# Patient Record
Sex: MALE | Race: WHITE | Employment: FULL TIME | URBAN - METROPOLITAN AREA
[De-identification: names, ages, dates, MRNs, and addresses within clinical notes are randomized per-mention and may not be internally consistent; named-entity substitution may affect disease eponyms.]

---

## 2017-03-13 ENCOUNTER — TELEPHONE (OUTPATIENT)
Dept: FAMILY MEDICINE CLINIC | Facility: CLINIC | Age: 57
End: 2017-03-13

## 2017-03-13 NOTE — TELEPHONE ENCOUNTER
Pt left message asking if he could have orders for Flu and Tdap. LOV 09/26/16. Called Pt and advised he is due for physical. Labs orders already in. Patient verbalized understanding and agrees with plan. Appt made for 03/23/17.

## 2017-03-13 NOTE — TELEPHONE ENCOUNTER
Patient would like to have flu vaccine done a long with a whooping cough vaccine. Please enter orders.

## 2017-03-23 ENCOUNTER — OFFICE VISIT (OUTPATIENT)
Dept: FAMILY MEDICINE CLINIC | Facility: CLINIC | Age: 57
End: 2017-03-23

## 2017-03-23 VITALS
HEIGHT: 72.5 IN | BODY MASS INDEX: 36.17 KG/M2 | HEART RATE: 76 BPM | RESPIRATION RATE: 16 BRPM | SYSTOLIC BLOOD PRESSURE: 108 MMHG | TEMPERATURE: 97 F | DIASTOLIC BLOOD PRESSURE: 80 MMHG | WEIGHT: 270 LBS

## 2017-03-23 DIAGNOSIS — Z12.11 SCREEN FOR COLON CANCER: ICD-10-CM

## 2017-03-23 DIAGNOSIS — Z82.49 FAMILY HISTORY OF ABDOMINAL AORTIC ANEURYSM (AAA): ICD-10-CM

## 2017-03-23 DIAGNOSIS — K21.9 GASTROESOPHAGEAL REFLUX DISEASE WITHOUT ESOPHAGITIS: ICD-10-CM

## 2017-03-23 DIAGNOSIS — E78.2 MIXED HYPERLIPIDEMIA: ICD-10-CM

## 2017-03-23 DIAGNOSIS — Z00.00 ANNUAL PHYSICAL EXAM: Primary | ICD-10-CM

## 2017-03-23 PROCEDURE — 90686 IIV4 VACC NO PRSV 0.5 ML IM: CPT | Performed by: FAMILY MEDICINE

## 2017-03-23 PROCEDURE — 90472 IMMUNIZATION ADMIN EACH ADD: CPT | Performed by: FAMILY MEDICINE

## 2017-03-23 PROCEDURE — 99396 PREV VISIT EST AGE 40-64: CPT | Performed by: FAMILY MEDICINE

## 2017-03-23 PROCEDURE — 90471 IMMUNIZATION ADMIN: CPT | Performed by: FAMILY MEDICINE

## 2017-03-23 PROCEDURE — 90715 TDAP VACCINE 7 YRS/> IM: CPT | Performed by: FAMILY MEDICINE

## 2017-03-23 RX ORDER — ROSUVASTATIN CALCIUM 10 MG/1
10 TABLET, COATED ORAL NIGHTLY
Qty: 90 TABLET | Refills: 3 | Status: SHIPPED | OUTPATIENT
Start: 2017-03-23 | End: 2018-01-01 | Stop reason: ALTCHOICE

## 2017-03-23 RX ORDER — PANTOPRAZOLE SODIUM 40 MG/1
40 TABLET, DELAYED RELEASE ORAL
COMMUNITY
End: 2017-12-01

## 2017-03-23 NOTE — PROGRESS NOTES
Rossy Schultz is a 61year old male who presents for a complete physical exam.   HPI:   Patient presents with:  Physical: Flu and TDAP       His last annual assessment has been over 1 year: Annual Physical due on 01/19/1962     brother MI at 72, brother # 2 Dysfunction. celecoxib (CELEBREX) 100 MG Oral Cap Take 1 capsule (100 mg total) by mouth 2 (two) times daily as needed. alprazolam (XANAX) 0.5 MG Oral Tab Take 1 tablet by mouth nightly as needed for Sleep.    Loratadine (CLARITIN) 10 MG Oral Cap Take 1 reviewed. Constitutional: He is oriented to person, place, and time. He appears well-developed and well-nourished. No distress. HENT:   Head: Normocephalic and atraumatic.    Right Ear: Hearing, tympanic membrane, external ear and ear canal normal. Tymp weekly.    Health maintenance, UTD Immunizations: UTD    Annual Physical due on [birth year]19/1962  FIT Colorectal Screening due on 01/19/2009  Colonoscopy,10 Years due on 01/19/2009  PSA due on 07/27/2013  Influenza Vaccine(1) due on 09/01/2016    Pt info given fo

## 2017-12-01 ENCOUNTER — OFFICE VISIT (OUTPATIENT)
Dept: FAMILY MEDICINE CLINIC | Facility: CLINIC | Age: 57
End: 2017-12-01

## 2017-12-01 VITALS
BODY MASS INDEX: 35.77 KG/M2 | WEIGHT: 267 LBS | DIASTOLIC BLOOD PRESSURE: 76 MMHG | SYSTOLIC BLOOD PRESSURE: 118 MMHG | HEIGHT: 72.5 IN | HEART RATE: 76 BPM | TEMPERATURE: 99 F | RESPIRATION RATE: 14 BRPM

## 2017-12-01 DIAGNOSIS — Z79.899 MEDICATION MANAGEMENT: ICD-10-CM

## 2017-12-01 DIAGNOSIS — Z12.11 SCREEN FOR COLON CANCER: ICD-10-CM

## 2017-12-01 DIAGNOSIS — F43.0 ACUTE STRESS REACTION: ICD-10-CM

## 2017-12-01 DIAGNOSIS — E78.2 MIXED HYPERLIPIDEMIA: Primary | ICD-10-CM

## 2017-12-01 PROCEDURE — 90471 IMMUNIZATION ADMIN: CPT | Performed by: FAMILY MEDICINE

## 2017-12-01 PROCEDURE — 99213 OFFICE O/P EST LOW 20 MIN: CPT | Performed by: FAMILY MEDICINE

## 2017-12-01 PROCEDURE — 90686 IIV4 VACC NO PRSV 0.5 ML IM: CPT | Performed by: FAMILY MEDICINE

## 2017-12-01 RX ORDER — VARDENAFIL HYDROCHLORIDE 20 MG/1
20 TABLET ORAL
Qty: 12 TABLET | Refills: 5 | Status: SHIPPED | OUTPATIENT
Start: 2017-12-01 | End: 2019-11-29 | Stop reason: ALTCHOICE

## 2017-12-01 RX ORDER — ALPRAZOLAM 0.5 MG/1
0.5 TABLET ORAL NIGHTLY PRN
Qty: 30 TABLET | Refills: 1 | Status: SHIPPED | OUTPATIENT
Start: 2017-12-01 | End: 2018-11-19

## 2017-12-01 RX ORDER — PANTOPRAZOLE SODIUM 40 MG/1
40 TABLET, DELAYED RELEASE ORAL
Qty: 90 TABLET | Refills: 3 | Status: SHIPPED | OUTPATIENT
Start: 2017-12-01 | End: 2018-11-19

## 2017-12-01 RX ORDER — VARDENAFIL HYDROCHLORIDE 20 MG/1
20 TABLET ORAL
COMMUNITY
End: 2017-12-01

## 2017-12-01 NOTE — PROGRESS NOTES
HPI:   Patient presents with:  Hyperlipidemia  Colonoscopy Screening: pt is due      Tamiko Medina is a 61year old male who presents for recheck of his hypertension. He has been taking medications as instructed, with no medication side effects.  His home BP facility-administered medications on file prior to visit. Past History:   He has Mixed hyperlipidemia; Allergic rhinitis; Hemorrhoids; Memory loss; Depression (emotion);  Tremor of right hand; ED (erectile dysfunction); GERD (gastroesophageal reflux dise Diet controlled with 's         Current Assessment & Plan     Stable, Continue present management.              Other Visit Diagnoses     Acute stress reaction        Relevant Medications    ALPRAZolam 0.5 MG Oral Tab    Medication management

## 2018-06-06 ENCOUNTER — OFFICE VISIT (OUTPATIENT)
Dept: FAMILY MEDICINE CLINIC | Facility: CLINIC | Age: 58
End: 2018-06-06

## 2018-06-06 VITALS
RESPIRATION RATE: 16 BRPM | HEIGHT: 74 IN | SYSTOLIC BLOOD PRESSURE: 132 MMHG | HEART RATE: 64 BPM | OXYGEN SATURATION: 98 % | TEMPERATURE: 98 F | WEIGHT: 264 LBS | BODY MASS INDEX: 33.88 KG/M2 | DIASTOLIC BLOOD PRESSURE: 76 MMHG

## 2018-06-06 DIAGNOSIS — R31.9 HEMATURIA, UNSPECIFIED TYPE: ICD-10-CM

## 2018-06-06 DIAGNOSIS — K52.9 GASTROENTERITIS: Primary | ICD-10-CM

## 2018-06-06 PROCEDURE — 99213 OFFICE O/P EST LOW 20 MIN: CPT | Performed by: PHYSICIAN ASSISTANT

## 2018-06-06 PROCEDURE — 81003 URINALYSIS AUTO W/O SCOPE: CPT | Performed by: PHYSICIAN ASSISTANT

## 2018-06-06 NOTE — PROGRESS NOTES
CHIEF COMPLAINT:   Patient presents with:  Sinus Problem: sinus congestion, fever 100.5 yesterday, diarrhea, vomiting-sunday, loss of appetite x4 days (pepto), Lisseth over the weekend    HPI:   Angelita Snow is a 60year old male who presents for complain Body mass index is 33.9 kg/m². Current Outpatient Prescriptions:  ALPRAZolam 0.5 MG Oral Tab Take 1 tablet (0.5 mg total) by mouth nightly as needed for Sleep.  Disp: 30 tablet Rfl: 1   Pantoprazole Sodium 40 MG Oral Tab EC Take 1 tablet (40 mg total) EYES: conjunctiva clear  EARS: TM's clear, no bulging, erythema or fluid bilaterally  NOSE: nostrils patent. Nasal mucosa pink and without exudates. THROAT: oral mucosa pink, slightly dry. Posterior pharynx is not erythematous. No exudates.   NECK: supple PLAN: Proper diet discussed. Increase fluid intake and start solids again. Still may be mildly dehydrated. Instructions as listed below.   Advised to go to the Emergency Room if any worsening of condition, persistent vomiting or diarrhea, any blood in stoo · Go to the Emergency Department if any worsening of condition for moderate to severe abdominal pain, flank pain, persistent vomiting or diarrhea, blood in stool or vomit, cannot keep down fluids, no urination in an 8-hour period of time, new onset of feve · Damage to the urinary tract may cause blood in the urine. This damage may be due to a blow or accident. It may also result from the use of a urinary catheter. Very hard exercise may sometimes irritate the urinary tract and cause bleeding.   · Cancer may o

## 2018-06-06 NOTE — PATIENT INSTRUCTIONS
Please follow up with your PCP if no improvement within 5-7 days. Go directly to the ER for any acute worsening of symptoms. · Rest as much as possible  · Try to drink lots of fluids.   Start with small sips of fluid every 5 minutes, as long as you can ke Many things can lead to blood in the urine (hematuria). The blood may be seen with the eye (macroscopic or gross hematuria). Or it may only be seen when the urine is looked at under a microscope (microscopic hematuria).  Some of the most common causes of bl © 7144-2037 [employer]. [address] [facility], [facility] [place]. All rights reserved. This information is not intended as a substitute for professional medical care. Always follow your healthcare professional's instructions.

## 2018-10-17 ENCOUNTER — TELEPHONE (OUTPATIENT)
Dept: FAMILY MEDICINE CLINIC | Facility: CLINIC | Age: 58
End: 2018-10-17

## 2018-10-17 DIAGNOSIS — Z11.59 ENCOUNTER FOR HEPATITIS C SCREENING TEST FOR LOW RISK PATIENT: ICD-10-CM

## 2018-10-17 DIAGNOSIS — Z00.00 LABORATORY EXAMINATION ORDERED AS PART OF A ROUTINE GENERAL MEDICAL EXAMINATION: ICD-10-CM

## 2018-10-17 DIAGNOSIS — Z12.5 SCREENING FOR PROSTATE CANCER: ICD-10-CM

## 2018-10-17 NOTE — TELEPHONE ENCOUNTER
Patient is scheduled for physical 11/19 please place lab orders to [address] [place] [address]

## 2018-11-09 ENCOUNTER — OFFICE VISIT (OUTPATIENT)
Dept: FAMILY MEDICINE CLINIC | Facility: CLINIC | Age: 58
End: 2018-11-09
Payer: COMMERCIAL

## 2018-11-09 VITALS
TEMPERATURE: 99 F | HEART RATE: 60 BPM | HEIGHT: 73 IN | RESPIRATION RATE: 16 BRPM | WEIGHT: 271 LBS | DIASTOLIC BLOOD PRESSURE: 62 MMHG | SYSTOLIC BLOOD PRESSURE: 116 MMHG | BODY MASS INDEX: 35.92 KG/M2

## 2018-11-09 DIAGNOSIS — K64.5 EXTERNAL THROMBOSED HEMORRHOIDS: Primary | ICD-10-CM

## 2018-11-09 DIAGNOSIS — S60.10XD: ICD-10-CM

## 2018-11-09 PROCEDURE — 90686 IIV4 VACC NO PRSV 0.5 ML IM: CPT | Performed by: FAMILY MEDICINE

## 2018-11-09 PROCEDURE — 90471 IMMUNIZATION ADMIN: CPT | Performed by: FAMILY MEDICINE

## 2018-11-09 PROCEDURE — 99213 OFFICE O/P EST LOW 20 MIN: CPT | Performed by: FAMILY MEDICINE

## 2018-11-09 RX ORDER — HYDROCORTISONE ACETATE 30 MG/1
1 SUPPOSITORY RECTAL 2 TIMES DAILY PRN
Qty: 28 SUPPOSITORY | Refills: 0 | Status: SHIPPED | OUTPATIENT
Start: 2018-11-09 | End: 2018-11-23

## 2018-11-09 RX ORDER — SILDENAFIL 100 MG/1
100 TABLET, FILM COATED ORAL
Qty: 30 TABLET | Refills: 3 | Status: SHIPPED | OUTPATIENT
Start: 2018-11-09 | End: 2019-11-29

## 2018-11-09 NOTE — PROGRESS NOTES
Patient presents with:  Finger Pain: Injured Right Thumb in 09/2018, pt states there is no more pain   Lump: On anus for x 2 weeks, has been using Hydrocortisone cream and does not notice improvement       Subjective   HPI:   This is a 60year old male com Abdominal: Soft. Normal appearance and bowel sounds are normal. There is no tenderness. Genitourinary: Rectal exam shows external hemorrhoid. Musculoskeletal:        Arms:  Neurological: He is alert and oriented to person, place, and time.    Skin

## 2018-11-09 NOTE — PATIENT INSTRUCTIONS
Thrombosed Hemorrhoids    Hemorrhoids are swollen veins in the lower rectum and anus. They're similar to varicose veins that form in the legs. Hemorrhoids can happen inside the rectum (internal hemorrhoids).  Or one may form at the anal opening (external Date Last Reviewed: 6/1/2016  © 3874-0659 [employer] [address]. [address] [facility], [address] [place]. All rights reserved. This information is not intended as a substitute for professional medical care.  Always follow your healthcare professional' Adding fiber to your diet can help relieve constipation by making stools softer and easier to pass. To increase your fiber intake, your healthcare provider may recommend a bulking agent, such as psyllium.  This is a high-fiber supplement available at most g The tips below offer some simple ways to add more high-fiber foods to your meals. · Start your day with a high-fiber breakfast. Eat a wheat bran cereal along with a sliced banana.  Or, try peanut butter on whole-wheat toast.  · Eat carrot sticks for snacks If the damaged nail isn't removed, it will most likely fall off on its own. A fingernail can regrow in a few months. Toenails take longer—as long as a year and a half.  See your healthcare provider if you have any problems with the nail as it heals and regr

## 2018-11-16 RX ORDER — MINOCYCLINE HYDROCHLORIDE 100 MG/1
100 CAPSULE ORAL
Refills: 0 | COMMUNITY
Start: 2018-07-20 | End: 2018-11-19 | Stop reason: ALTCHOICE

## 2018-11-19 ENCOUNTER — OFFICE VISIT (OUTPATIENT)
Dept: FAMILY MEDICINE CLINIC | Facility: CLINIC | Age: 58
End: 2018-11-19
Payer: COMMERCIAL

## 2018-11-19 VITALS
HEIGHT: 72.5 IN | RESPIRATION RATE: 12 BRPM | BODY MASS INDEX: 35.77 KG/M2 | DIASTOLIC BLOOD PRESSURE: 70 MMHG | TEMPERATURE: 98 F | WEIGHT: 267 LBS | HEART RATE: 64 BPM | SYSTOLIC BLOOD PRESSURE: 102 MMHG

## 2018-11-19 DIAGNOSIS — Z79.899 MEDICATION MANAGEMENT: ICD-10-CM

## 2018-11-19 DIAGNOSIS — Z00.00 ANNUAL PHYSICAL EXAM: Primary | ICD-10-CM

## 2018-11-19 DIAGNOSIS — Z12.11 SCREEN FOR COLON CANCER: ICD-10-CM

## 2018-11-19 DIAGNOSIS — F43.0 ACUTE STRESS REACTION: ICD-10-CM

## 2018-11-19 DIAGNOSIS — R31.21 ASYMPTOMATIC MICROSCOPIC HEMATURIA: ICD-10-CM

## 2018-11-19 DIAGNOSIS — M25.50 ARTHRALGIA OF MULTIPLE SITES: ICD-10-CM

## 2018-11-19 DIAGNOSIS — E78.2 MIXED HYPERLIPIDEMIA: ICD-10-CM

## 2018-11-19 PROCEDURE — 99396 PREV VISIT EST AGE 40-64: CPT | Performed by: FAMILY MEDICINE

## 2018-11-19 RX ORDER — ALPRAZOLAM 0.5 MG/1
0.5 TABLET ORAL NIGHTLY PRN
Qty: 30 TABLET | Refills: 1 | Status: SHIPPED | OUTPATIENT
Start: 2018-11-19 | End: 2019-11-29

## 2018-11-19 RX ORDER — PANTOPRAZOLE SODIUM 40 MG/1
40 TABLET, DELAYED RELEASE ORAL
Qty: 90 TABLET | Refills: 3 | Status: SHIPPED | OUTPATIENT
Start: 2018-11-19 | End: 2019-08-16

## 2018-11-20 NOTE — PROGRESS NOTES
Jennyfer Saavedra is a 60year old male who presents for a complete physical exam.   HPI:   Patient presents with:  Physical  Testing: colonoscopy      His last annual assessment has been over 1 year: Annual Physical due on 03/23/2018       Pt complains of doing 09:39 AM     (H) 11/09/2018 09:39 AM    NONHDLC 159 (H) 11/09/2018 09:39 AM       No results found for: A1C, VITD, PSA           Current Outpatient Medications on File Prior to Visit:  Econazole Nitrate 1 % External Cream APPLY TOPICALLY TWICE A DAY completed. Pertinent positives and negatives noted in the the HPI.  Specifically:  GEN:  No fever or fatigue  HEAD:  No headaches  EYES:  No vision change  EARS:  No hearing loss  MOUTH/THROAT:  No sore throat or dental problems  HEART:  No chest pain or p Soft. Bowel sounds are normal. He exhibits no distension. There is no hepatosplenomegaly. There is no tenderness. There is no rebound and no guarding. No hernia. Musculoskeletal: Normal range of motion.    Lymphadenopathy:     He has no cervical adenopath Celecoxib 100         Current Assessment & Plan     Stable Continue present management.             Other Visit Diagnoses     Annual physical exam    -  Primary    Acute stress reaction        Relevant Medications    ALPRAZolam 0.5 MG Oral Tab    Medicatio

## 2019-05-23 ENCOUNTER — HOSPITAL ENCOUNTER (OUTPATIENT)
Dept: GENERAL RADIOLOGY | Facility: HOSPITAL | Age: 59
Discharge: HOME | End: 2019-05-23
Attending: PHYSICIAN ASSISTANT
Payer: COMMERCIAL

## 2019-05-23 ENCOUNTER — OFFICE VISIT (OUTPATIENT)
Dept: FAMILY MEDICINE CLINIC | Facility: CLINIC | Age: 59
End: 2019-05-23
Payer: COMMERCIAL

## 2019-05-23 VITALS
SYSTOLIC BLOOD PRESSURE: 92 MMHG | OXYGEN SATURATION: 98 % | TEMPERATURE: 100 F | HEART RATE: 72 BPM | WEIGHT: 268.81 LBS | DIASTOLIC BLOOD PRESSURE: 70 MMHG | BODY MASS INDEX: 36.41 KG/M2 | RESPIRATION RATE: 16 BRPM | HEIGHT: 72 IN

## 2019-05-23 DIAGNOSIS — R05.9 COUGH: Primary | ICD-10-CM

## 2019-05-23 DIAGNOSIS — R05.9 COUGH: ICD-10-CM

## 2019-05-23 PROCEDURE — 99213 OFFICE O/P EST LOW 20 MIN: CPT | Performed by: PHYSICIAN ASSISTANT

## 2019-05-23 PROCEDURE — 71046 X-RAY EXAM CHEST 2 VIEWS: CPT | Performed by: PHYSICIAN ASSISTANT

## 2019-05-23 PROCEDURE — 94640 AIRWAY INHALATION TREATMENT: CPT | Performed by: PHYSICIAN ASSISTANT

## 2019-05-23 RX ORDER — PREDNISONE 20 MG/1
40 TABLET ORAL DAILY
Qty: 10 TABLET | Refills: 0 | Status: SHIPPED | OUTPATIENT
Start: 2019-05-23 | End: 2019-05-28

## 2019-05-23 RX ORDER — ALBUTEROL SULFATE 2.5 MG/3ML
2.5 SOLUTION RESPIRATORY (INHALATION) ONCE
Status: COMPLETED | OUTPATIENT
Start: 2019-05-23 | End: 2019-05-23

## 2019-05-23 RX ORDER — CODEINE PHOSPHATE AND GUAIFENESIN 10; 100 MG/5ML; MG/5ML
5 SOLUTION ORAL EVERY 6 HOURS PRN
Qty: 118 ML | Refills: 0 | Status: SHIPPED | OUTPATIENT
Start: 2019-05-23 | End: 2019-11-08

## 2019-05-23 RX ORDER — ALBUTEROL SULFATE 90 UG/1
2 AEROSOL, METERED RESPIRATORY (INHALATION) EVERY 4 HOURS PRN
Qty: 1 INHALER | Refills: 1 | Status: SHIPPED | OUTPATIENT
Start: 2019-05-23 | End: 2019-11-08

## 2019-05-23 RX ADMIN — ALBUTEROL SULFATE 2.5 MG: 2.5 SOLUTION RESPIRATORY (INHALATION) at 15:44:00

## 2019-05-25 NOTE — PROGRESS NOTES
CC: Persistent dry cough     HISTORY OF PRESENT ILLNESS  Vivian Bender is a 60year old male who presents for evaluation of cough. For the last 5 nights, he has had a persistently dry cough.  He does feel that his chest has been tight with wheezing and shortn Hemorrhoids     Memory loss     Depression (emotion)     Tremor of right hand     ED (erectile dysfunction)     GERD (gastroesophageal reflux disease)     Arthralgia of multiple sites     Rosacea     Arthritis of multiple sites      Past Surgical History:

## 2019-07-15 PROCEDURE — 88305 TISSUE EXAM BY PATHOLOGIST: CPT | Performed by: INTERNAL MEDICINE

## 2019-11-08 ENCOUNTER — OFFICE VISIT (OUTPATIENT)
Dept: FAMILY MEDICINE CLINIC | Facility: CLINIC | Age: 59
End: 2019-11-08
Payer: COMMERCIAL

## 2019-11-08 VITALS
RESPIRATION RATE: 16 BRPM | SYSTOLIC BLOOD PRESSURE: 124 MMHG | TEMPERATURE: 98 F | WEIGHT: 267 LBS | HEIGHT: 72 IN | DIASTOLIC BLOOD PRESSURE: 70 MMHG | BODY MASS INDEX: 36.16 KG/M2 | HEART RATE: 79 BPM

## 2019-11-08 DIAGNOSIS — R05.9 COUGH: Primary | ICD-10-CM

## 2019-11-08 PROCEDURE — 99213 OFFICE O/P EST LOW 20 MIN: CPT | Performed by: PHYSICIAN ASSISTANT

## 2019-11-08 RX ORDER — CODEINE PHOSPHATE AND GUAIFENESIN 10; 100 MG/5ML; MG/5ML
5 SOLUTION ORAL EVERY 6 HOURS PRN
Qty: 118 ML | Refills: 0 | Status: SHIPPED | OUTPATIENT
Start: 2019-11-08 | End: 2019-11-29 | Stop reason: ALTCHOICE

## 2019-11-08 NOTE — PROGRESS NOTES
Patient presents with:  Cough: monday,  yellow phlegm   Chest Congestion: pressure, cheratussin       HISTORY OF PRESENT ILLNESS  Renetta Quinteros is a 60year old male who presents for evaluation of cough.  For the past 4-5 days, he has been having a productive current smoker    Alcohol use: No      Alcohol/week: 0.0 standard drinks    Drug use: No       11/08/19  1047   BP: 124/70   Pulse: 79   Resp: 16   Temp: 97.9 °F (36.6 °C)       PHYSICAL EXAM  GENERAL: Alert male in no acute distress.   HEAD: Normocephalic,

## 2019-11-15 ENCOUNTER — OFFICE VISIT (OUTPATIENT)
Dept: FAMILY MEDICINE CLINIC | Facility: CLINIC | Age: 59
End: 2019-11-15
Payer: COMMERCIAL

## 2019-11-15 VITALS
BODY MASS INDEX: 35.76 KG/M2 | WEIGHT: 264 LBS | DIASTOLIC BLOOD PRESSURE: 60 MMHG | SYSTOLIC BLOOD PRESSURE: 130 MMHG | RESPIRATION RATE: 18 BRPM | HEART RATE: 74 BPM | TEMPERATURE: 98 F | HEIGHT: 72 IN

## 2019-11-15 DIAGNOSIS — R05.9 COUGH: Primary | ICD-10-CM

## 2019-11-15 PROCEDURE — 99213 OFFICE O/P EST LOW 20 MIN: CPT | Performed by: PHYSICIAN ASSISTANT

## 2019-11-15 RX ORDER — AZITHROMYCIN 250 MG/1
TABLET, FILM COATED ORAL
Qty: 6 TABLET | Refills: 0 | Status: SHIPPED | OUTPATIENT
Start: 2019-11-15 | End: 2019-11-29 | Stop reason: ALTCHOICE

## 2019-11-15 RX ORDER — PREDNISONE 20 MG/1
TABLET ORAL
Qty: 8 TABLET | Refills: 0 | Status: SHIPPED | OUTPATIENT
Start: 2019-11-15 | End: 2019-11-20

## 2019-11-18 NOTE — PROGRESS NOTES
Patient presents with:  Cough: X 1 WEEK. FEELING ABOUT THE SAME . PHLEGM YELLOW. NOT OTC       HISTORY OF PRESENT ILLNESS  Garret Saenz is a 60year old male who presents for evaluation of persistent cough. I last saw him for this on 11/8.  At that time, dis Arthritis of multiple sites      Past Surgical History:   Procedure Laterality Date   • Anesth,knee arthroscopy  8/1/06   • Anesth,knee arthroscopy  1/1/82    Right   • Appendectomy      As a child       Social History    Tobacco Use      Smoking status: N

## 2019-11-29 ENCOUNTER — OFFICE VISIT (OUTPATIENT)
Dept: FAMILY MEDICINE CLINIC | Facility: CLINIC | Age: 59
End: 2019-11-29
Payer: COMMERCIAL

## 2019-11-29 VITALS
TEMPERATURE: 97 F | HEIGHT: 72 IN | WEIGHT: 264 LBS | RESPIRATION RATE: 16 BRPM | SYSTOLIC BLOOD PRESSURE: 104 MMHG | DIASTOLIC BLOOD PRESSURE: 70 MMHG | BODY MASS INDEX: 35.76 KG/M2 | HEART RATE: 60 BPM

## 2019-11-29 DIAGNOSIS — F32.A DEPRESSION, UNSPECIFIED DEPRESSION TYPE: ICD-10-CM

## 2019-11-29 DIAGNOSIS — N52.9 ERECTILE DYSFUNCTION, UNSPECIFIED ERECTILE DYSFUNCTION TYPE: ICD-10-CM

## 2019-11-29 DIAGNOSIS — Z00.00 LABORATORY EXAMINATION ORDERED AS PART OF A ROUTINE GENERAL MEDICAL EXAMINATION: ICD-10-CM

## 2019-11-29 DIAGNOSIS — F43.0 ACUTE STRESS REACTION: ICD-10-CM

## 2019-11-29 DIAGNOSIS — Z79.899 MEDICATION MANAGEMENT: ICD-10-CM

## 2019-11-29 DIAGNOSIS — E78.2 MIXED HYPERLIPIDEMIA: Primary | ICD-10-CM

## 2019-11-29 PROCEDURE — 90686 IIV4 VACC NO PRSV 0.5 ML IM: CPT | Performed by: FAMILY MEDICINE

## 2019-11-29 PROCEDURE — 99214 OFFICE O/P EST MOD 30 MIN: CPT | Performed by: FAMILY MEDICINE

## 2019-11-29 PROCEDURE — 90471 IMMUNIZATION ADMIN: CPT | Performed by: FAMILY MEDICINE

## 2019-11-29 RX ORDER — SILDENAFIL 100 MG/1
100 TABLET, FILM COATED ORAL
Qty: 30 TABLET | Refills: 3 | Status: SHIPPED | OUTPATIENT
Start: 2019-11-29 | End: 2021-07-01

## 2019-11-29 RX ORDER — ALPRAZOLAM 0.5 MG/1
0.5 TABLET ORAL NIGHTLY PRN
Qty: 30 TABLET | Refills: 1 | Status: SHIPPED | OUTPATIENT
Start: 2019-11-29

## 2019-11-29 NOTE — PROGRESS NOTES
HPI:   Patient presents with:  Erectile Dysfuntion    Subjective   Dora Braga is a 60year old male who presents for recheck of his hypertension. He has been taking medications as instructed, with no medication side effects.  His home BP monitoring in the HENT:   Head: Normocephalic and atraumatic.    Right Ear: Tympanic membrane, external ear and ear canal normal.   Left Ear: Tympanic membrane, external ear and ear canal normal.   Nose: Nose normal.   Mouth/Throat: Uvula is midline, oropharynx is clear an diet, exercise and weight control, and labs as ordered      Problem List Items Addressed This Visit        Cardiovascular    Mixed hyperlipidemia - Primary    Overview     Diet controlled with 's         Current Assessment & Plan     Stable labs due

## 2020-01-27 RX ORDER — PANTOPRAZOLE SODIUM 40 MG/1
TABLET, DELAYED RELEASE ORAL
Qty: 90 TABLET | Refills: 3 | OUTPATIENT
Start: 2020-01-27

## 2020-07-15 ENCOUNTER — OFFICE VISIT (OUTPATIENT)
Dept: FAMILY MEDICINE CLINIC | Facility: CLINIC | Age: 60
End: 2020-07-15
Payer: COMMERCIAL

## 2020-07-15 VITALS
HEIGHT: 73 IN | TEMPERATURE: 97 F | DIASTOLIC BLOOD PRESSURE: 70 MMHG | WEIGHT: 276 LBS | BODY MASS INDEX: 36.58 KG/M2 | SYSTOLIC BLOOD PRESSURE: 124 MMHG | HEART RATE: 70 BPM | RESPIRATION RATE: 16 BRPM

## 2020-07-15 DIAGNOSIS — Z00.00 LABORATORY EXAMINATION ORDERED AS PART OF A ROUTINE GENERAL MEDICAL EXAMINATION: ICD-10-CM

## 2020-07-15 DIAGNOSIS — B35.1 ONYCHOMYCOSIS OF TOENAIL: Primary | ICD-10-CM

## 2020-07-15 PROCEDURE — 99213 OFFICE O/P EST LOW 20 MIN: CPT | Performed by: FAMILY MEDICINE

## 2020-07-15 PROCEDURE — 3008F BODY MASS INDEX DOCD: CPT | Performed by: FAMILY MEDICINE

## 2020-07-15 PROCEDURE — 3078F DIAST BP <80 MM HG: CPT | Performed by: FAMILY MEDICINE

## 2020-07-15 PROCEDURE — 3074F SYST BP LT 130 MM HG: CPT | Performed by: FAMILY MEDICINE

## 2020-07-15 RX ORDER — PANTOPRAZOLE SODIUM 40 MG/1
40 TABLET, DELAYED RELEASE ORAL
Qty: 180 TABLET | Refills: 0 | Status: SHIPPED | OUTPATIENT
Start: 2020-07-15 | End: 2021-08-16

## 2020-07-15 NOTE — PROGRESS NOTES
Patient presents with:  Fungus Nails: Fungal on Toenails on both Feet      Subjective   HPI:   This is a 63year old male coming in for toenail infection, 5 total nails,     HPI   See reviewed tab for PMSFHx  REVIEW OF SYSTEMS:   GENERAL HEALTH: feels well 7/15/2020, 9:58 AM

## 2020-07-24 LAB
ALBUMIN/GLOBULIN RATIO: 1.5 (CALC) (ref 1–2.5)
ALBUMIN: 4.4 G/DL (ref 3.6–5.1)
ALKALINE PHOSPHATASE: 60 U/L (ref 35–144)
ALT: 15 U/L (ref 9–46)
AST: 15 U/L (ref 10–35)
BILIRUBIN, TOTAL: 0.6 MG/DL (ref 0.2–1.2)
BUN: 14 MG/DL (ref 7–25)
CALCIUM: 9.8 MG/DL (ref 8.6–10.3)
CARBON DIOXIDE: 28 MMOL/L (ref 20–32)
CHLORIDE: 104 MMOL/L (ref 98–110)
CHOL/HDLC RATIO: 5.1 (CALC)
CHOLESTEROL, TOTAL: 219 MG/DL
CREATININE: 1.13 MG/DL (ref 0.7–1.25)
EGFR IF AFRICN AM: 81 ML/MIN/1.73M2
EGFR IF NONAFRICN AM: 70 ML/MIN/1.73M2
GLOBULIN: 2.9 G/DL (CALC) (ref 1.9–3.7)
GLUCOSE: 84 MG/DL (ref 65–99)
HDL CHOLESTEROL: 43 MG/DL
HEMATOCRIT: 51.5 % (ref 38.5–50)
HEMOGLOBIN: 17.6 G/DL (ref 13.2–17.1)
LDL-CHOLESTEROL: 154 MG/DL (CALC)
MCH: 30.4 PG (ref 27–33)
MCHC: 34.2 G/DL (ref 32–36)
MCV: 89.1 FL (ref 80–100)
MPV: 9.5 FL (ref 7.5–12.5)
NON-HDL CHOLESTEROL: 176 MG/DL (CALC)
PLATELET COUNT: 262 THOUSAND/UL (ref 140–400)
POTASSIUM: 4.8 MMOL/L (ref 3.5–5.3)
PROTEIN, TOTAL: 7.3 G/DL (ref 6.1–8.1)
RDW: 13.2 % (ref 11–15)
RED BLOOD CELL COUNT: 5.78 MILLION/UL (ref 4.2–5.8)
SODIUM: 140 MMOL/L (ref 135–146)
TRIGLYCERIDES: 105 MG/DL
TSH W/REFLEX TO FT4: 1.35 MIU/L (ref 0.4–4.5)
WHITE BLOOD CELL COUNT: 6.9 THOUSAND/UL (ref 3.8–10.8)

## 2020-07-31 ENCOUNTER — TELEPHONE (OUTPATIENT)
Dept: FAMILY MEDICINE CLINIC | Facility: CLINIC | Age: 60
End: 2020-07-31

## 2020-07-31 DIAGNOSIS — B35.1 ONYCHOMYCOSIS OF TOENAIL: Primary | ICD-10-CM

## 2020-07-31 RX ORDER — HYDROCORTISONE 25 MG/G
CREAM TOPICAL
Qty: 28.35 G | Refills: 3 | Status: SHIPPED | OUTPATIENT
Start: 2020-07-31

## 2020-07-31 NOTE — TELEPHONE ENCOUNTER
Patient called requesting a cream for toe nail fungus to be called in to the pharmacy?  States spoke about it with Dr Deondre Barrera on his appt on 07/15

## 2020-07-31 NOTE — TELEPHONE ENCOUNTER
Requested Prescriptions     Pending Prescriptions Disp Refills   • PROCTOSOL HC 2.5 % External Cream [Pharmacy Med Name: PROCTOSOL-HC 2.5% CREAM] 28.35 g 3     Sig: PLACE 1 APPLICATION RECTALLY 2 (TWO) TIMES DAILY FOR 10 DAYS.      LOV 7/15/2020     Patient

## 2020-08-02 RX ORDER — TERBINAFINE HYDROCHLORIDE 250 MG/1
250 TABLET ORAL DAILY
Qty: 84 TABLET | Refills: 0 | Status: SHIPPED | OUTPATIENT
Start: 2020-08-02 | End: 2020-08-07

## 2020-08-02 NOTE — TELEPHONE ENCOUNTER
Diagnoses and all orders for this visit:    Onychomycosis of toenail  -     Terbinafine HCl 250 MG Oral Tab; Take 1 tablet (250 mg total) by mouth daily. OK to notify.  Thanks, Amparo Baldwin MD

## 2020-08-04 PROBLEM — B35.1 ONYCHOMYCOSIS OF TOENAIL: Status: ACTIVE | Noted: 2020-08-04

## 2020-08-05 ENCOUNTER — TELEPHONE (OUTPATIENT)
Dept: FAMILY MEDICINE CLINIC | Facility: CLINIC | Age: 60
End: 2020-08-05

## 2020-08-05 DIAGNOSIS — B35.1 ONYCHOMYCOSIS OF TOENAIL: ICD-10-CM

## 2020-08-05 NOTE — TELEPHONE ENCOUNTER
Received fax for pt for a PA for Terbinafine  mg. Paperwork in triage.      Patient verbalized understanding the PA process

## 2020-08-06 NOTE — TELEPHONE ENCOUNTER
Completed PA for Terbinafine 250mg using [employer] form on [facility] documenting pt diagnosis B35.1. Received denial for coverage of Terbinafine.  Insurance requires a test to confirm fungus and/or pt immunocompromised with an extensive or complicated fungal infe

## 2020-08-07 RX ORDER — TERBINAFINE HYDROCHLORIDE 250 MG/1
250 TABLET ORAL DAILY
Qty: 84 TABLET | Refills: 0 | Status: SHIPPED | OUTPATIENT
Start: 2020-08-07 | End: 2020-10-30

## 2021-04-09 DIAGNOSIS — Z23 NEED FOR VACCINATION: ICD-10-CM

## 2021-04-27 NOTE — TELEPHONE ENCOUNTER
Refill request for:    Requested Prescriptions     Pending Prescriptions Disp Refills   • PANTOPRAZOLE SODIUM 40 MG Oral Tab EC [Pharmacy Med Name: PANTOPRAZOLE SOD DR 40 MG TAB] 180 tablet 0     Sig: TAKE 1 TABLET (40 MG TOTAL) BY MOUTH 2 (TWO) TIMES KENYON

## 2021-05-03 NOTE — TELEPHONE ENCOUNTER
LM for patient to schedule his physical and to make sure he has enough medication to get to that appt.

## 2021-05-04 ENCOUNTER — TELEPHONE (OUTPATIENT)
Dept: FAMILY MEDICINE CLINIC | Facility: CLINIC | Age: 61
End: 2021-05-04

## 2021-05-04 DIAGNOSIS — Z00.00 LABORATORY EXAMINATION ORDERED AS PART OF A ROUTINE GENERAL MEDICAL EXAMINATION: ICD-10-CM

## 2021-05-04 DIAGNOSIS — E78.2 MIXED HYPERLIPIDEMIA: Primary | ICD-10-CM

## 2021-05-04 DIAGNOSIS — Z12.5 SCREENING FOR PROSTATE CANCER: ICD-10-CM

## 2021-05-04 NOTE — TELEPHONE ENCOUNTER
Please enter lab orders for the patient's upcoming physical appointment. Physical scheduled:    Your appointments     Date & Time Appointment Department [facility])    May 19, 2021  3:30 PM CDT Physical - Established with Armen Sarmiento  [facility]

## 2021-05-04 NOTE — TELEPHONE ENCOUNTER
Patient called back and scheduled his physical for 05/19/21, patient has enough medication to last him until his appointment

## 2021-05-04 NOTE — TELEPHONE ENCOUNTER
1. Mixed hyperlipidemia (Primary)  Overview:  Diet controlled with 's  2. Laboratory examination ordered as part of a routine general medical examination  -     Comp Metabolic Panel (14)  -     Lipid Panel  -     CBC, Platelet;  No Differential  -

## 2021-05-05 RX ORDER — PANTOPRAZOLE SODIUM 40 MG/1
40 TABLET, DELAYED RELEASE ORAL
Qty: 180 TABLET | Refills: 0 | OUTPATIENT
Start: 2021-05-05

## 2021-05-10 ENCOUNTER — OFFICE VISIT (OUTPATIENT)
Dept: FAMILY MEDICINE CLINIC | Facility: CLINIC | Age: 61
End: 2021-05-10
Payer: COMMERCIAL

## 2021-05-10 VITALS
OXYGEN SATURATION: 98 % | BODY MASS INDEX: 36.98 KG/M2 | RESPIRATION RATE: 14 BRPM | WEIGHT: 279 LBS | HEART RATE: 78 BPM | HEIGHT: 73 IN | SYSTOLIC BLOOD PRESSURE: 118 MMHG | DIASTOLIC BLOOD PRESSURE: 70 MMHG | TEMPERATURE: 99 F

## 2021-05-10 DIAGNOSIS — H60.542 ACUTE ECZEMATOID OTITIS EXTERNA OF LEFT EAR: Primary | ICD-10-CM

## 2021-05-10 PROCEDURE — 3078F DIAST BP <80 MM HG: CPT | Performed by: FAMILY MEDICINE

## 2021-05-10 PROCEDURE — 99213 OFFICE O/P EST LOW 20 MIN: CPT | Performed by: FAMILY MEDICINE

## 2021-05-10 PROCEDURE — 3074F SYST BP LT 130 MM HG: CPT | Performed by: FAMILY MEDICINE

## 2021-05-10 PROCEDURE — 3008F BODY MASS INDEX DOCD: CPT | Performed by: FAMILY MEDICINE

## 2021-05-10 RX ORDER — NEOMYCIN SULFATE, POLYMYXIN B SULFATE AND HYDROCORTISONE 10; 3.5; 1 MG/ML; MG/ML; [USP'U]/ML
3 SUSPENSION/ DROPS AURICULAR (OTIC) 3 TIMES DAILY
Qty: 10 ML | Refills: 0 | Status: SHIPPED | OUTPATIENT
Start: 2021-05-10 | End: 2021-05-18 | Stop reason: ALTCHOICE

## 2021-05-10 NOTE — PROGRESS NOTES
Chief Complaint:  Patient presents with:  Ear Problem: x 3 weeks, Left Ear Itchy     HPI:  This is a 57year old male patient presenting for Ear Problem (x 3 weeks, Left Ear Itchy )    Notes L ear pruritus for about 3 weeks.  Denies drainage, pain or flakin 1   • PROCTOSOL HC 2.5 % External Cream PLACE 1 APPLICATION RECTALLY 2 (TWO) TIMES DAILY FOR 10 DAYS. 28.35 g 3   • Pantoprazole Sodium 40 MG Oral Tab EC Take 1 tablet (40 mg total) by mouth 2 (two) times daily before meals.  180 tablet 0   • Sildenafil Cit 5/10/2021 2:30 PM  Family Medicine

## 2021-05-18 ENCOUNTER — OFFICE VISIT (OUTPATIENT)
Dept: FAMILY MEDICINE CLINIC | Facility: CLINIC | Age: 61
End: 2021-05-18
Payer: COMMERCIAL

## 2021-05-18 VITALS
BODY MASS INDEX: 36.58 KG/M2 | RESPIRATION RATE: 16 BRPM | HEART RATE: 70 BPM | SYSTOLIC BLOOD PRESSURE: 110 MMHG | WEIGHT: 276 LBS | DIASTOLIC BLOOD PRESSURE: 80 MMHG | HEIGHT: 73 IN | TEMPERATURE: 97 F

## 2021-05-18 DIAGNOSIS — H93.8X2 PRESSURE SENSATION IN LEFT EAR: Primary | ICD-10-CM

## 2021-05-18 PROCEDURE — 3079F DIAST BP 80-89 MM HG: CPT | Performed by: NURSE PRACTITIONER

## 2021-05-18 PROCEDURE — 3074F SYST BP LT 130 MM HG: CPT | Performed by: NURSE PRACTITIONER

## 2021-05-18 PROCEDURE — 99213 OFFICE O/P EST LOW 20 MIN: CPT | Performed by: NURSE PRACTITIONER

## 2021-05-18 PROCEDURE — 3008F BODY MASS INDEX DOCD: CPT | Performed by: NURSE PRACTITIONER

## 2021-05-18 RX ORDER — PREDNISONE 20 MG/1
40 TABLET ORAL DAILY
Qty: 10 TABLET | Refills: 0 | Status: SHIPPED | OUTPATIENT
Start: 2021-05-18

## 2021-05-18 NOTE — PROGRESS NOTES
Patient presents with:  Ear Problem: follow up for infection, pt stated getting worse       HPI:  Presents with approx 4 week history of left ear itching w/o fever/chills, nasal/sinus congestion, sore throat, ear pain/pressure, cough, loss of smell/taste, Tab Take 1 tablet (100 mg total) by mouth daily as needed for Erectile Dysfunction. 30 tablet 3   • ALPRAZolam 0.5 MG Oral Tab Take 1 tablet (0.5 mg total) by mouth nightly as needed for Sleep.  30 tablet 1   • Econazole Nitrate 1 % External Cream APPLY TOP

## 2021-05-19 ENCOUNTER — OFFICE VISIT (OUTPATIENT)
Dept: FAMILY MEDICINE CLINIC | Facility: CLINIC | Age: 61
End: 2021-05-19
Payer: COMMERCIAL

## 2021-05-19 VITALS
DIASTOLIC BLOOD PRESSURE: 80 MMHG | HEIGHT: 73 IN | SYSTOLIC BLOOD PRESSURE: 114 MMHG | RESPIRATION RATE: 18 BRPM | WEIGHT: 275 LBS | BODY MASS INDEX: 36.45 KG/M2 | HEART RATE: 76 BPM

## 2021-05-19 DIAGNOSIS — L71.9 ROSACEA: ICD-10-CM

## 2021-05-19 DIAGNOSIS — Z00.00 ANNUAL PHYSICAL EXAM: Primary | ICD-10-CM

## 2021-05-19 DIAGNOSIS — K21.9 GASTROESOPHAGEAL REFLUX DISEASE, UNSPECIFIED WHETHER ESOPHAGITIS PRESENT: ICD-10-CM

## 2021-05-19 DIAGNOSIS — E78.2 MIXED HYPERLIPIDEMIA: ICD-10-CM

## 2021-05-19 DIAGNOSIS — F33.0 MILD EPISODE OF RECURRENT MAJOR DEPRESSIVE DISORDER (HCC): ICD-10-CM

## 2021-05-19 PROCEDURE — 3008F BODY MASS INDEX DOCD: CPT | Performed by: FAMILY MEDICINE

## 2021-05-19 PROCEDURE — 3074F SYST BP LT 130 MM HG: CPT | Performed by: FAMILY MEDICINE

## 2021-05-19 PROCEDURE — 99396 PREV VISIT EST AGE 40-64: CPT | Performed by: FAMILY MEDICINE

## 2021-05-19 PROCEDURE — 3079F DIAST BP 80-89 MM HG: CPT | Performed by: FAMILY MEDICINE

## 2021-05-19 RX ORDER — AMOXICILLIN 500 MG/1
1000 CAPSULE ORAL 2 TIMES DAILY
Qty: 40 CAPSULE | Refills: 0 | Status: SHIPPED | OUTPATIENT
Start: 2021-05-19 | End: 2021-05-29

## 2021-05-19 NOTE — PROGRESS NOTES
Tamiko Medina is a 57year old male who presents for a complete physical exam.     had concerns including Physical (annual ).    His last annual assessment has been over 1 year: Annual Physical due on 11/19/2019       HPI/Subjective:    He complains of doing polyuria. Genitourinary: Negative. Musculoskeletal: Negative. Negative for neck pain. Skin: Negative. Neurological: Negative. Psychiatric/Behavioral: Negative. All other systems reviewed and are negative.        Results:    Lab Results   Co Wt 275 lb (124.7 kg)   BMI 36.28 kg/m²  Estimated body mass index is 36.28 kg/m² as calculated from the following:    Height as of this encounter: 6' 1\" (1.854 m). Weight as of this encounter: 275 lb (124.7 kg).    Physical Exam  Vitals and nursing note Behavior normal.         Thought Content: Thought content normal.          Assessment & Plan:    Rossy Schultz is a 57year old male who presents for a complete physical exam.   Pt's weight is Body mass index is 36.28 kg/m². , recommended low fat diet and aer HC, mupirocin, metroNIDAZOLE, and predniSONE. Return in about 6 months (around 11/19/2021) for recheck.

## 2021-07-01 DIAGNOSIS — N52.9 ERECTILE DYSFUNCTION, UNSPECIFIED ERECTILE DYSFUNCTION TYPE: ICD-10-CM

## 2021-07-01 RX ORDER — SILDENAFIL 100 MG/1
100 TABLET, FILM COATED ORAL
Qty: 30 TABLET | Refills: 3 | Status: SHIPPED | OUTPATIENT
Start: 2021-07-01 | End: 2021-09-29

## 2021-07-01 NOTE — TELEPHONE ENCOUNTER
Pt is calling he needs his Sildenafil Citrate 100 mg refilled to [facility] on [address]. He is completely out and they said he needed to call us for it to be sent to them.

## 2021-08-16 RX ORDER — PANTOPRAZOLE SODIUM 40 MG/1
40 TABLET, DELAYED RELEASE ORAL
Qty: 180 TABLET | Refills: 0 | OUTPATIENT
Start: 2021-08-16

## 2021-08-16 RX ORDER — PANTOPRAZOLE SODIUM 40 MG/1
40 TABLET, DELAYED RELEASE ORAL
Qty: 180 TABLET | Refills: 1 | Status: SHIPPED | OUTPATIENT
Start: 2021-08-16

## 2021-08-16 NOTE — TELEPHONE ENCOUNTER
Pantoprazole Sodium 40 MG Oral Tab EC  PATIENT SAYS HE HAS ONE PILL LEFT FOR TOMORROW.  PLEASE INSTRUCT PATIENT ON HOW TO PROCEED

## 2021-08-16 NOTE — TELEPHONE ENCOUNTER
Refill request for:    Requested Prescriptions     Pending Prescriptions Disp Refills   • pantoprazole 40 MG Oral Tab  tablet 0     Sig: Take 1 tablet (40 mg total) by mouth 2 (two) times daily before meals.         Last Prescribed Quantity Refills

## 2021-08-16 NOTE — TELEPHONE ENCOUNTER
PANTOPRAZOLE SOD DR 40 MG TAB      Possible duplicate: Meredith to review recent actions on this medication    Sig: Take 1 tablet (40 mg total) by mouth 2 (two) times daily before meals.     Disp:  180 tablet    Refills:  0 (Pharmacy requested: Not specified)

## 2021-09-16 ENCOUNTER — TELEPHONE (OUTPATIENT)
Dept: FAMILY MEDICINE CLINIC | Facility: CLINIC | Age: 61
End: 2021-09-16

## 2021-09-16 NOTE — TELEPHONE ENCOUNTER
Called and talked to patient stephen has not had any symptoms in over 10 days so not considered infected

## 2021-09-16 NOTE — TELEPHONE ENCOUNTER
Pt states he was in a meeting in late August 08/25/21 and someone in the meeting recently just tested COVID positive in early September.  Pt states he was just notified today of the the positive COVID test. Pt states he has not been having symptoms and he d

## 2021-09-22 ENCOUNTER — TELEPHONE (OUTPATIENT)
Dept: FAMILY MEDICINE CLINIC | Facility: CLINIC | Age: 61
End: 2021-09-22

## 2021-09-22 DIAGNOSIS — Z20.822 EXPOSURE TO COVID-19 VIRUS: Primary | ICD-10-CM

## 2021-09-22 NOTE — TELEPHONE ENCOUNTER
Pt states has with daughter in law 3 days ago who tested positive for covid today. Daughter in law was vaccinated. Pt is fully vaccinated and has no symptoms. Pt requesting a Covid Test due to him traveling for work and just wants to make sure.  Explained t

## 2021-09-22 NOTE — TELEPHONE ENCOUNTER
Dr. Yoselyn Almodovar states he received phone call that pt states he was exposed to [employer]. Pt called in 09/16/21 and states he was exposed to [employer] and had not been experiencing any symptoms after 10 days.

## 2021-09-24 ENCOUNTER — LAB ENCOUNTER (OUTPATIENT)
Dept: LAB | Facility: HOSPITAL | Age: 61
End: 2021-09-24
Attending: FAMILY MEDICINE
Payer: COMMERCIAL

## 2021-09-24 DIAGNOSIS — Z20.822 EXPOSURE TO COVID-19 VIRUS: ICD-10-CM

## 2021-09-26 LAB — SARS-COV-2 RNA RESP QL NAA+PROBE: NOT DETECTED

## 2021-09-28 ENCOUNTER — TELEPHONE (OUTPATIENT)
Dept: FAMILY MEDICINE CLINIC | Facility: CLINIC | Age: 61
End: 2021-09-28

## 2021-09-28 DIAGNOSIS — R35.1 FREQUENT URINATION AT NIGHT: ICD-10-CM

## 2021-09-28 DIAGNOSIS — N52.9 ERECTILE DYSFUNCTION, UNSPECIFIED ERECTILE DYSFUNCTION TYPE: Primary | ICD-10-CM

## 2021-09-28 NOTE — TELEPHONE ENCOUNTER
Referral request Dr. Matthias Morales M.D. Patient is having frequent urination at night and also erectile dysfunction.    He has a PPO plan and was able to find an open appt soon for Dr. Matthias Morales M.D.

## 2022-02-21 RX ORDER — PANTOPRAZOLE SODIUM 40 MG/1
40 TABLET, DELAYED RELEASE ORAL
Qty: 180 TABLET | Refills: 1 | Status: SHIPPED | OUTPATIENT
Start: 2022-02-21

## 2022-05-23 DIAGNOSIS — Z79.899 MEDICATION MANAGEMENT: ICD-10-CM

## 2022-05-23 DIAGNOSIS — F43.0 ACUTE STRESS REACTION: ICD-10-CM

## 2022-05-23 NOTE — TELEPHONE ENCOUNTER
Pt requesting a refill on his ALPRAZolam 0.5 MG Oral Tab through [facility]. Was to call us for refill as it is an old request

## 2022-05-24 RX ORDER — ALPRAZOLAM 0.5 MG/1
0.5 TABLET ORAL NIGHTLY PRN
Qty: 30 TABLET | Refills: 0 | Status: SHIPPED | OUTPATIENT
Start: 2022-05-24

## 2022-09-15 ENCOUNTER — TELEPHONE (OUTPATIENT)
Dept: FAMILY MEDICINE CLINIC | Facility: CLINIC | Age: 62
End: 2022-09-15

## 2022-09-15 NOTE — TELEPHONE ENCOUNTER
Stated yesterday was flying home got home had runny nose tested positive for covid I sent instructions to him to read then went over OTC medication to take and when he can go out with a mask at 5 days and when he should be back to normal at 10 days. He acknowledged the instructions.

## 2022-09-15 NOTE — TELEPHONE ENCOUNTER
Patient is calling he said he tested positive for Covid last night. Runny nose, sinus congestion, cough and fatigue.

## 2022-11-01 ENCOUNTER — TELEPHONE (OUTPATIENT)
Dept: FAMILY MEDICINE CLINIC | Facility: CLINIC | Age: 62
End: 2022-11-01

## 2022-11-01 DIAGNOSIS — Z12.5 SCREENING FOR PROSTATE CANCER: ICD-10-CM

## 2022-11-01 DIAGNOSIS — R73.9 HYPERGLYCEMIA: ICD-10-CM

## 2022-11-01 DIAGNOSIS — Z00.00 LABORATORY EXAMINATION ORDERED AS PART OF A ROUTINE GENERAL MEDICAL EXAMINATION: ICD-10-CM

## 2022-11-01 NOTE — TELEPHONE ENCOUNTER
Please enter lab orders for the patient's upcoming physical appointment. Physical scheduled: Your appointments     Date & Time Appointment Department [facility])    Dec 21, 2022  2:30 PM CST Physical - Established with Sean Desouza MD [facility], [address], [place]  (Becky Carcamo)            [place]osalina Winkler [address] [id number]         Preferred lab: [facility]     The patient has been notified to complete fasting labs prior to their physical appointment.

## 2022-11-20 NOTE — TELEPHONE ENCOUNTER
1. Laboratory examination ordered as part of a routine general medical examination  -     Lipid Panel  -     TSH W Reflex To Free T4  -     CBC, Platelet; No Differential  -     Hemoglobin A1C  -     Comp Metabolic Panel (14)  -     PSA, Diagnostic  2. Screening for prostate cancer  -     PSA, Diagnostic  3. Hyperglycemia  -     Hemoglobin A1C       OK to notify.  Thanks, Darline Garcia MD

## 2022-11-30 ENCOUNTER — OFFICE VISIT (OUTPATIENT)
Dept: FAMILY MEDICINE CLINIC | Facility: CLINIC | Age: 62
End: 2022-11-30
Payer: COMMERCIAL

## 2022-11-30 VITALS
BODY MASS INDEX: 34 KG/M2 | HEART RATE: 70 BPM | HEIGHT: 72 IN | SYSTOLIC BLOOD PRESSURE: 128 MMHG | WEIGHT: 251 LBS | RESPIRATION RATE: 18 BRPM | DIASTOLIC BLOOD PRESSURE: 68 MMHG

## 2022-11-30 DIAGNOSIS — H65.01 NON-RECURRENT ACUTE SEROUS OTITIS MEDIA OF RIGHT EAR: ICD-10-CM

## 2022-11-30 DIAGNOSIS — R05.2 SUBACUTE COUGH: Primary | ICD-10-CM

## 2022-11-30 PROCEDURE — 3008F BODY MASS INDEX DOCD: CPT | Performed by: FAMILY MEDICINE

## 2022-11-30 PROCEDURE — 3078F DIAST BP <80 MM HG: CPT | Performed by: FAMILY MEDICINE

## 2022-11-30 PROCEDURE — 3074F SYST BP LT 130 MM HG: CPT | Performed by: FAMILY MEDICINE

## 2022-11-30 PROCEDURE — 99213 OFFICE O/P EST LOW 20 MIN: CPT | Performed by: FAMILY MEDICINE

## 2022-11-30 RX ORDER — PREDNISONE 10 MG/1
TABLET ORAL
Qty: 35 TABLET | Refills: 0 | Status: SHIPPED | OUTPATIENT
Start: 2022-11-30 | End: 2022-12-20

## 2022-11-30 RX ORDER — AMOXICILLIN 500 MG/1
1000 CAPSULE ORAL 2 TIMES DAILY
Qty: 40 CAPSULE | Refills: 0 | Status: SHIPPED | OUTPATIENT
Start: 2022-11-30 | End: 2022-12-10

## 2022-11-30 RX ORDER — CODEINE PHOSPHATE AND GUAIFENESIN 10; 100 MG/5ML; MG/5ML
5 SOLUTION ORAL 4 TIMES DAILY PRN
Qty: 118 ML | Refills: 0 | Status: SHIPPED | OUTPATIENT
Start: 2022-11-30 | End: 2022-12-07

## 2022-11-30 RX ORDER — TAMSULOSIN HYDROCHLORIDE 0.4 MG/1
0.4 CAPSULE ORAL DAILY
COMMUNITY
Start: 2022-11-28 | End: 2023-11-28

## 2022-11-30 RX ORDER — ALBUTEROL SULFATE 90 UG/1
2 AEROSOL, METERED RESPIRATORY (INHALATION) EVERY 6 HOURS PRN
Qty: 1 EACH | Refills: 2 | Status: SHIPPED | OUTPATIENT
Start: 2022-11-30

## 2022-12-17 LAB
ALBUMIN/GLOBULIN RATIO: 1.4 (CALC) (ref 1–2.5)
ALBUMIN: 3.8 G/DL (ref 3.6–5.1)
ALKALINE PHOSPHATASE: 59 U/L (ref 35–144)
ALT: 13 U/L (ref 9–46)
AST: 14 U/L (ref 10–35)
BILIRUBIN, TOTAL: 0.6 MG/DL (ref 0.2–1.2)
BUN: 18 MG/DL (ref 7–25)
CALCIUM: 9.3 MG/DL (ref 8.6–10.3)
CARBON DIOXIDE: 27 MMOL/L (ref 20–32)
CHLORIDE: 105 MMOL/L (ref 98–110)
CHOL/HDLC RATIO: 4.3 (CALC)
CHOLESTEROL, TOTAL: 223 MG/DL
CREATININE: 1.04 MG/DL (ref 0.7–1.35)
EGFR: 81 ML/MIN/1.73M2
GLOBULIN: 2.7 G/DL (CALC) (ref 1.9–3.7)
GLUCOSE: 81 MG/DL (ref 65–99)
HDL CHOLESTEROL: 52 MG/DL
HEMATOCRIT: 47.8 % (ref 38.5–50)
HEMOGLOBIN A1C: 5.3 % OF TOTAL HGB
HEMOGLOBIN: 15.5 G/DL (ref 13.2–17.1)
LDL-CHOLESTEROL: 151 MG/DL (CALC)
MCH: 28.8 PG (ref 27–33)
MCHC: 32.4 G/DL (ref 32–36)
MCV: 88.7 FL (ref 80–100)
MPV: 10.2 FL (ref 7.5–12.5)
NON-HDL CHOLESTEROL: 171 MG/DL (CALC)
PLATELET COUNT: 295 THOUSAND/UL (ref 140–400)
POTASSIUM: 4.4 MMOL/L (ref 3.5–5.3)
PROTEIN, TOTAL: 6.5 G/DL (ref 6.1–8.1)
PSA, TOTAL: 0.47 NG/ML
RDW: 13.8 % (ref 11–15)
RED BLOOD CELL COUNT: 5.39 MILLION/UL (ref 4.2–5.8)
SODIUM: 140 MMOL/L (ref 135–146)
TRIGLYCERIDES: 90 MG/DL
TSH W/REFLEX TO FT4: 2.8 MIU/L (ref 0.4–4.5)
WHITE BLOOD CELL COUNT: 9.2 THOUSAND/UL (ref 3.8–10.8)

## 2022-12-21 ENCOUNTER — OFFICE VISIT (OUTPATIENT)
Dept: FAMILY MEDICINE CLINIC | Facility: CLINIC | Age: 62
End: 2022-12-21
Payer: COMMERCIAL

## 2022-12-21 VITALS
SYSTOLIC BLOOD PRESSURE: 98 MMHG | HEIGHT: 69 IN | RESPIRATION RATE: 16 BRPM | WEIGHT: 248.63 LBS | DIASTOLIC BLOOD PRESSURE: 60 MMHG | HEART RATE: 68 BPM | BODY MASS INDEX: 36.83 KG/M2

## 2022-12-21 DIAGNOSIS — Z00.00 ANNUAL PHYSICAL EXAM: Primary | ICD-10-CM

## 2022-12-21 DIAGNOSIS — E78.2 MIXED HYPERLIPIDEMIA: ICD-10-CM

## 2022-12-21 DIAGNOSIS — L71.9 ROSACEA: ICD-10-CM

## 2022-12-21 DIAGNOSIS — Z79.899 MEDICATION MANAGEMENT: ICD-10-CM

## 2022-12-21 DIAGNOSIS — F43.0 ACUTE STRESS REACTION: ICD-10-CM

## 2022-12-21 DIAGNOSIS — F33.0 MILD EPISODE OF RECURRENT MAJOR DEPRESSIVE DISORDER (HCC): ICD-10-CM

## 2022-12-21 DIAGNOSIS — K21.9 GASTROESOPHAGEAL REFLUX DISEASE, UNSPECIFIED WHETHER ESOPHAGITIS PRESENT: ICD-10-CM

## 2022-12-21 PROCEDURE — 3008F BODY MASS INDEX DOCD: CPT | Performed by: FAMILY MEDICINE

## 2022-12-21 PROCEDURE — 3078F DIAST BP <80 MM HG: CPT | Performed by: FAMILY MEDICINE

## 2022-12-21 PROCEDURE — 99396 PREV VISIT EST AGE 40-64: CPT | Performed by: FAMILY MEDICINE

## 2022-12-21 PROCEDURE — 3074F SYST BP LT 130 MM HG: CPT | Performed by: FAMILY MEDICINE

## 2022-12-21 RX ORDER — ALPRAZOLAM 0.5 MG/1
0.5 TABLET ORAL NIGHTLY PRN
Qty: 30 TABLET | Refills: 5 | Status: SHIPPED | OUTPATIENT
Start: 2022-12-21

## 2023-01-03 ENCOUNTER — OFFICE VISIT (OUTPATIENT)
Dept: FAMILY MEDICINE CLINIC | Facility: CLINIC | Age: 63
End: 2023-01-03
Payer: COMMERCIAL

## 2023-01-03 VITALS
HEART RATE: 72 BPM | OXYGEN SATURATION: 99 % | DIASTOLIC BLOOD PRESSURE: 70 MMHG | BODY MASS INDEX: 35.21 KG/M2 | HEIGHT: 72 IN | TEMPERATURE: 97 F | SYSTOLIC BLOOD PRESSURE: 118 MMHG | RESPIRATION RATE: 18 BRPM | WEIGHT: 260 LBS

## 2023-01-03 DIAGNOSIS — R68.89 FLU-LIKE SYMPTOMS: Primary | ICD-10-CM

## 2023-01-03 DIAGNOSIS — J01.00 ACUTE MAXILLARY SINUSITIS, RECURRENCE NOT SPECIFIED: ICD-10-CM

## 2023-01-03 LAB
CONTROL LINE PRESENT WITH A CLEAR BACKGROUND (YES/NO): YES YES/NO
STREP GRP A CUL-SCR: NEGATIVE

## 2023-01-03 PROCEDURE — 3074F SYST BP LT 130 MM HG: CPT | Performed by: NURSE PRACTITIONER

## 2023-01-03 PROCEDURE — 3008F BODY MASS INDEX DOCD: CPT | Performed by: NURSE PRACTITIONER

## 2023-01-03 PROCEDURE — 3078F DIAST BP <80 MM HG: CPT | Performed by: NURSE PRACTITIONER

## 2023-01-03 PROCEDURE — 99213 OFFICE O/P EST LOW 20 MIN: CPT | Performed by: NURSE PRACTITIONER

## 2023-01-03 PROCEDURE — 87880 STREP A ASSAY W/OPTIC: CPT | Performed by: NURSE PRACTITIONER

## 2023-01-03 RX ORDER — DOXYCYCLINE HYCLATE 100 MG/1
100 CAPSULE ORAL 2 TIMES DAILY
Qty: 14 CAPSULE | Refills: 0 | Status: SHIPPED | OUTPATIENT
Start: 2023-01-03 | End: 2023-01-10

## 2023-01-03 NOTE — PATIENT INSTRUCTIONS
Continue to use Flonase as discussed for the next two weeks. Start Doxycycline twice daily for one week. Stay upright for one hour after use. Salt water gargles (1 tsp. Salt in 6 oz lukewarm water), use several times daily to help with pain and post nasal drainage. Saline nasal spray to nostrils 2-3 times daily if needed to help remove drainage or congestion in nose. Hydrate! (cold or hot based on comfort). Drink lots of water or other non dehydrating liquids to help with illness. Use warm pack to head/face for comfort for 20 minutes on/off if needed. May use humidifier in bedroom at night to help with congestion. Hot steamy showers can loosen congestion and help cough. Hand washing-use hand  or wash hands frequently, cover your cough or sneeze, do not share towels or drinks with others. May use Tylenol or Ibuprofen over the counter for pain/comfort if not contraindicated. Follow up in 10-14 days after illness started with primary care if symptoms not complete resolved or sooner if worsening symptoms. Seek immediate care if inability to swallow or breathe or if symptoms improve greatly then worsen again.

## 2023-03-03 ENCOUNTER — TELEPHONE (OUTPATIENT)
Dept: FAMILY MEDICINE CLINIC | Facility: CLINIC | Age: 63
End: 2023-03-03

## 2023-03-03 DIAGNOSIS — F43.0 ACUTE STRESS REACTION: ICD-10-CM

## 2023-03-03 DIAGNOSIS — Z79.899 MEDICATION MANAGEMENT: ICD-10-CM

## 2023-03-03 RX ORDER — FLUTICASONE PROPIONATE 50 MCG
2 SPRAY, SUSPENSION (ML) NASAL DAILY
COMMUNITY
Start: 2022-12-24

## 2023-03-03 RX ORDER — ALPRAZOLAM 0.5 MG/1
TABLET ORAL EVERY 4 HOURS PRN
Qty: 30 TABLET | Refills: 5 | Status: SHIPPED | OUTPATIENT
Start: 2023-03-03

## 2023-03-09 ENCOUNTER — TELEPHONE (OUTPATIENT)
Dept: FAMILY MEDICINE CLINIC | Facility: CLINIC | Age: 63
End: 2023-03-09

## 2023-03-09 RX ORDER — ONDANSETRON 4 MG/1
4 TABLET, ORALLY DISINTEGRATING ORAL EVERY 8 HOURS PRN
Qty: 20 TABLET | Refills: 0 | Status: SHIPPED | OUTPATIENT
Start: 2023-03-09

## 2023-03-09 NOTE — TELEPHONE ENCOUNTER
Started Tuesday night he is starting to feel better and has been able to keep fluids down but would like to see if there is a medication that would help.

## 2023-03-09 NOTE — TELEPHONE ENCOUNTER
Pt is calling because he has had stomach virus for last 2 days has been throwing up and has diarrhea and is getting dehydrated and wants to know if there is anti nausea med he can take

## 2023-06-07 ENCOUNTER — LAB REQUISITION (OUTPATIENT)
Dept: LAB | Facility: HOSPITAL | Age: 63
End: 2023-06-07
Payer: COMMERCIAL

## 2023-06-07 DIAGNOSIS — S40.911A UNSPECIFIED SUPERFICIAL INJURY OF RIGHT SHOULDER, INITIAL ENCOUNTER: ICD-10-CM

## 2023-06-07 DIAGNOSIS — S40.921A UNSPECIFIED SUPERFICIAL INJURY OF RIGHT UPPER ARM, INITIAL ENCOUNTER: ICD-10-CM

## 2023-06-07 PROCEDURE — 87075 CULTR BACTERIA EXCEPT BLOOD: CPT | Performed by: ORTHOPAEDIC SURGERY

## 2023-06-07 PROCEDURE — 87205 SMEAR GRAM STAIN: CPT | Performed by: ORTHOPAEDIC SURGERY

## 2023-06-07 PROCEDURE — 87076 CULTURE ANAEROBE IDENT EACH: CPT | Performed by: ORTHOPAEDIC SURGERY

## 2023-06-07 PROCEDURE — 87070 CULTURE OTHR SPECIMN AEROBIC: CPT | Performed by: ORTHOPAEDIC SURGERY

## 2023-06-28 ENCOUNTER — ANESTHESIA EVENT (OUTPATIENT)
Dept: SURGERY | Facility: HOSPITAL | Age: 63
End: 2023-06-28
Payer: COMMERCIAL

## 2023-06-28 ENCOUNTER — HOSPITAL ENCOUNTER (OUTPATIENT)
Facility: HOSPITAL | Age: 63
Discharge: HOME | End: 2023-06-29
Attending: ORTHOPAEDIC SURGERY | Admitting: ORTHOPAEDIC SURGERY
Payer: COMMERCIAL

## 2023-06-28 ENCOUNTER — ANESTHESIA (OUTPATIENT)
Dept: SURGERY | Facility: HOSPITAL | Age: 63
End: 2023-06-28
Payer: COMMERCIAL

## 2023-06-28 PROBLEM — L02.413 ABSCESS OF RIGHT SHOULDER: Status: ACTIVE | Noted: 2023-06-28

## 2023-06-28 LAB — ERYTHROCYTE [SEDIMENTATION RATE] IN BLOOD: 48 MM/HR

## 2023-06-28 PROCEDURE — 87176 TISSUE HOMOGENIZATION CULTR: CPT | Performed by: ORTHOPAEDIC SURGERY

## 2023-06-28 PROCEDURE — 87070 CULTURE OTHR SPECIMN AEROBIC: CPT | Performed by: ORTHOPAEDIC SURGERY

## 2023-06-28 PROCEDURE — 0RBJ4ZZ EXCISION OF RIGHT SHOULDER JOINT, PERCUTANEOUS ENDOSCOPIC APPROACH: ICD-10-PCS | Performed by: ORTHOPAEDIC SURGERY

## 2023-06-28 PROCEDURE — 87077 CULTURE AEROBIC IDENTIFY: CPT | Performed by: ORTHOPAEDIC SURGERY

## 2023-06-28 PROCEDURE — 87205 SMEAR GRAM STAIN: CPT | Performed by: ORTHOPAEDIC SURGERY

## 2023-06-28 PROCEDURE — 87075 CULTR BACTERIA EXCEPT BLOOD: CPT | Performed by: ORTHOPAEDIC SURGERY

## 2023-06-28 PROCEDURE — 85652 RBC SED RATE AUTOMATED: CPT | Performed by: ORTHOPAEDIC SURGERY

## 2023-06-28 PROCEDURE — 0J9D0ZZ DRAINAGE OF RIGHT UPPER ARM SUBCUTANEOUS TISSUE AND FASCIA, OPEN APPROACH: ICD-10-PCS | Performed by: ORTHOPAEDIC SURGERY

## 2023-06-28 RX ORDER — HYDROCODONE BITARTRATE AND ACETAMINOPHEN 5; 325 MG/1; MG/1
1 TABLET ORAL ONCE AS NEEDED
Status: DISCONTINUED | OUTPATIENT
Start: 2023-06-28 | End: 2023-06-28 | Stop reason: HOSPADM

## 2023-06-28 RX ORDER — OXYCODONE HYDROCHLORIDE 5 MG/1
2.5 TABLET ORAL EVERY 4 HOURS PRN
Status: DISCONTINUED | OUTPATIENT
Start: 2023-06-28 | End: 2023-06-29

## 2023-06-28 RX ORDER — PROCHLORPERAZINE EDISYLATE 5 MG/ML
5 INJECTION INTRAMUSCULAR; INTRAVENOUS EVERY 8 HOURS PRN
Status: DISCONTINUED | OUTPATIENT
Start: 2023-06-28 | End: 2023-06-28 | Stop reason: HOSPADM

## 2023-06-28 RX ORDER — ONDANSETRON 2 MG/ML
4 INJECTION INTRAMUSCULAR; INTRAVENOUS EVERY 6 HOURS PRN
Status: DISCONTINUED | OUTPATIENT
Start: 2023-06-28 | End: 2023-06-28 | Stop reason: HOSPADM

## 2023-06-28 RX ORDER — DEXAMETHASONE SODIUM PHOSPHATE 4 MG/ML
VIAL (ML) INJECTION AS NEEDED
Status: DISCONTINUED | OUTPATIENT
Start: 2023-06-28 | End: 2023-06-28 | Stop reason: SURG

## 2023-06-28 RX ORDER — ROCURONIUM BROMIDE 10 MG/ML
INJECTION, SOLUTION INTRAVENOUS AS NEEDED
Status: DISCONTINUED | OUTPATIENT
Start: 2023-06-28 | End: 2023-06-28 | Stop reason: SURG

## 2023-06-28 RX ORDER — LIDOCAINE HYDROCHLORIDE 10 MG/ML
INJECTION, SOLUTION EPIDURAL; INFILTRATION; INTRACAUDAL; PERINEURAL AS NEEDED
Status: DISCONTINUED | OUTPATIENT
Start: 2023-06-28 | End: 2023-06-28 | Stop reason: SURG

## 2023-06-28 RX ORDER — ONDANSETRON 2 MG/ML
INJECTION INTRAMUSCULAR; INTRAVENOUS
Status: COMPLETED
Start: 2023-06-28 | End: 2023-06-28

## 2023-06-28 RX ORDER — GLYCOPYRROLATE 0.2 MG/ML
INJECTION, SOLUTION INTRAMUSCULAR; INTRAVENOUS AS NEEDED
Status: DISCONTINUED | OUTPATIENT
Start: 2023-06-28 | End: 2023-06-28 | Stop reason: SURG

## 2023-06-28 RX ORDER — HYDROMORPHONE HYDROCHLORIDE 1 MG/ML
0.4 INJECTION, SOLUTION INTRAMUSCULAR; INTRAVENOUS; SUBCUTANEOUS EVERY 5 MIN PRN
Status: DISCONTINUED | OUTPATIENT
Start: 2023-06-28 | End: 2023-06-28 | Stop reason: HOSPADM

## 2023-06-28 RX ORDER — PROCHLORPERAZINE EDISYLATE 5 MG/ML
INJECTION INTRAMUSCULAR; INTRAVENOUS
Status: DISCONTINUED
Start: 2023-06-28 | End: 2023-06-28 | Stop reason: WASHOUT

## 2023-06-28 RX ORDER — HYDROCODONE BITARTRATE AND ACETAMINOPHEN 10; 325 MG/1; MG/1
TABLET ORAL
COMMUNITY
Start: 2023-04-26 | End: 2023-06-29

## 2023-06-28 RX ORDER — DOXEPIN HYDROCHLORIDE 50 MG/1
1 CAPSULE ORAL DAILY
Status: DISCONTINUED | OUTPATIENT
Start: 2023-06-29 | End: 2023-06-29

## 2023-06-28 RX ORDER — ONDANSETRON 2 MG/ML
4 INJECTION INTRAMUSCULAR; INTRAVENOUS EVERY 6 HOURS PRN
Status: DISCONTINUED | OUTPATIENT
Start: 2023-06-28 | End: 2023-06-29

## 2023-06-28 RX ORDER — NALOXONE HYDROCHLORIDE 0.4 MG/ML
80 INJECTION, SOLUTION INTRAMUSCULAR; INTRAVENOUS; SUBCUTANEOUS AS NEEDED
Status: DISCONTINUED | OUTPATIENT
Start: 2023-06-28 | End: 2023-06-28 | Stop reason: HOSPADM

## 2023-06-28 RX ORDER — HYDROMORPHONE HYDROCHLORIDE 1 MG/ML
0.2 INJECTION, SOLUTION INTRAMUSCULAR; INTRAVENOUS; SUBCUTANEOUS EVERY 5 MIN PRN
Status: DISCONTINUED | OUTPATIENT
Start: 2023-06-28 | End: 2023-06-28 | Stop reason: HOSPADM

## 2023-06-28 RX ORDER — CEFAZOLIN SODIUM/WATER 2 G/20 ML
2 SYRINGE (ML) INTRAVENOUS EVERY 8 HOURS
Status: COMPLETED | OUTPATIENT
Start: 2023-06-29 | End: 2023-06-29

## 2023-06-28 RX ORDER — HYDROCODONE BITARTRATE AND ACETAMINOPHEN 5; 325 MG/1; MG/1
2 TABLET ORAL ONCE AS NEEDED
Status: DISCONTINUED | OUTPATIENT
Start: 2023-06-28 | End: 2023-06-28 | Stop reason: HOSPADM

## 2023-06-28 RX ORDER — PANTOPRAZOLE SODIUM 40 MG/1
40 TABLET, DELAYED RELEASE ORAL
Status: DISCONTINUED | OUTPATIENT
Start: 2023-06-28 | End: 2023-06-29

## 2023-06-28 RX ORDER — NAPROXEN 500 MG/1
500 TABLET ORAL 2 TIMES DAILY
COMMUNITY
Start: 2023-06-26 | End: 2023-06-29

## 2023-06-28 RX ORDER — PROCHLORPERAZINE EDISYLATE 5 MG/ML
5 INJECTION INTRAMUSCULAR; INTRAVENOUS EVERY 8 HOURS PRN
Status: DISCONTINUED | OUTPATIENT
Start: 2023-06-28 | End: 2023-06-29

## 2023-06-28 RX ORDER — POLYETHYLENE GLYCOL 3350 17 G/17G
17 POWDER, FOR SOLUTION ORAL DAILY PRN
Status: DISCONTINUED | OUTPATIENT
Start: 2023-06-28 | End: 2023-06-29

## 2023-06-28 RX ORDER — HYDROMORPHONE HYDROCHLORIDE 1 MG/ML
0.6 INJECTION, SOLUTION INTRAMUSCULAR; INTRAVENOUS; SUBCUTANEOUS EVERY 5 MIN PRN
Status: DISCONTINUED | OUTPATIENT
Start: 2023-06-28 | End: 2023-06-28 | Stop reason: HOSPADM

## 2023-06-28 RX ORDER — ACETAMINOPHEN 500 MG
1000 TABLET ORAL ONCE AS NEEDED
Status: DISCONTINUED | OUTPATIENT
Start: 2023-06-28 | End: 2023-06-28 | Stop reason: HOSPADM

## 2023-06-28 RX ORDER — OXYCODONE HYDROCHLORIDE 5 MG/1
5 TABLET ORAL EVERY 4 HOURS PRN
Status: DISCONTINUED | OUTPATIENT
Start: 2023-06-28 | End: 2023-06-29

## 2023-06-28 RX ORDER — CEFAZOLIN SODIUM/WATER 2 G/20 ML
2 SYRINGE (ML) INTRAVENOUS ONCE
Status: COMPLETED | OUTPATIENT
Start: 2023-06-28 | End: 2023-06-28

## 2023-06-28 RX ORDER — ONDANSETRON 2 MG/ML
INJECTION INTRAMUSCULAR; INTRAVENOUS AS NEEDED
Status: DISCONTINUED | OUTPATIENT
Start: 2023-06-28 | End: 2023-06-28 | Stop reason: SURG

## 2023-06-28 RX ORDER — SODIUM CHLORIDE, SODIUM LACTATE, POTASSIUM CHLORIDE, CALCIUM CHLORIDE 600; 310; 30; 20 MG/100ML; MG/100ML; MG/100ML; MG/100ML
INJECTION, SOLUTION INTRAVENOUS CONTINUOUS
Status: DISCONTINUED | OUTPATIENT
Start: 2023-06-28 | End: 2023-06-28 | Stop reason: HOSPADM

## 2023-06-28 RX ORDER — NEOSTIGMINE METHYLSULFATE 1 MG/ML
INJECTION, SOLUTION INTRAVENOUS AS NEEDED
Status: DISCONTINUED | OUTPATIENT
Start: 2023-06-28 | End: 2023-06-28 | Stop reason: SURG

## 2023-06-28 RX ORDER — SENNOSIDES 8.6 MG
17.2 TABLET ORAL NIGHTLY
Status: DISCONTINUED | OUTPATIENT
Start: 2023-06-28 | End: 2023-06-29

## 2023-06-28 RX ORDER — TAMSULOSIN HYDROCHLORIDE 0.4 MG/1
0.4 CAPSULE ORAL DAILY
Status: DISCONTINUED | OUTPATIENT
Start: 2023-06-28 | End: 2023-06-29

## 2023-06-28 RX ORDER — DOCUSATE SODIUM 100 MG/1
100 CAPSULE, LIQUID FILLED ORAL 2 TIMES DAILY
Status: DISCONTINUED | OUTPATIENT
Start: 2023-06-28 | End: 2023-06-29

## 2023-06-28 RX ORDER — BISACODYL 10 MG
10 SUPPOSITORY, RECTAL RECTAL
Status: DISCONTINUED | OUTPATIENT
Start: 2023-06-28 | End: 2023-06-29

## 2023-06-28 RX ORDER — ENEMA 19; 7 G/133ML; G/133ML
1 ENEMA RECTAL ONCE AS NEEDED
Status: DISCONTINUED | OUTPATIENT
Start: 2023-06-28 | End: 2023-06-29

## 2023-06-28 RX ORDER — ACETAMINOPHEN 500 MG
1000 TABLET ORAL ONCE
Status: DISCONTINUED | OUTPATIENT
Start: 2023-06-28 | End: 2023-06-29

## 2023-06-28 RX ORDER — HYDROMORPHONE HYDROCHLORIDE 1 MG/ML
INJECTION, SOLUTION INTRAMUSCULAR; INTRAVENOUS; SUBCUTANEOUS
Status: DISCONTINUED
Start: 2023-06-28 | End: 2023-06-28 | Stop reason: WASHOUT

## 2023-06-28 RX ORDER — SODIUM CHLORIDE, SODIUM LACTATE, POTASSIUM CHLORIDE, CALCIUM CHLORIDE 600; 310; 30; 20 MG/100ML; MG/100ML; MG/100ML; MG/100ML
INJECTION, SOLUTION INTRAVENOUS CONTINUOUS
Status: DISCONTINUED | OUTPATIENT
Start: 2023-06-28 | End: 2023-06-28

## 2023-06-28 RX ADMIN — NEOSTIGMINE METHYLSULFATE 5 MG: 1 INJECTION, SOLUTION INTRAVENOUS at 16:36:00

## 2023-06-28 RX ADMIN — LIDOCAINE HYDROCHLORIDE 50 MG: 10 INJECTION, SOLUTION EPIDURAL; INFILTRATION; INTRACAUDAL; PERINEURAL at 15:13:00

## 2023-06-28 RX ADMIN — SODIUM CHLORIDE, SODIUM LACTATE, POTASSIUM CHLORIDE, CALCIUM CHLORIDE: 600; 310; 30; 20 INJECTION, SOLUTION INTRAVENOUS at 15:09:00

## 2023-06-28 RX ADMIN — GLYCOPYRROLATE 0.8 MG: 0.2 INJECTION, SOLUTION INTRAMUSCULAR; INTRAVENOUS at 16:36:00

## 2023-06-28 RX ADMIN — ONDANSETRON 4 MG: 2 INJECTION INTRAMUSCULAR; INTRAVENOUS at 16:36:00

## 2023-06-28 RX ADMIN — DEXAMETHASONE SODIUM PHOSPHATE 8 MG: 4 MG/ML VIAL (ML) INJECTION at 15:20:00

## 2023-06-28 RX ADMIN — CEFAZOLIN SODIUM/WATER 2 G: 2 G/20 ML SYRINGE (ML) INTRAVENOUS at 15:40:00

## 2023-06-28 RX ADMIN — ROCURONIUM BROMIDE 50 MG: 10 INJECTION, SOLUTION INTRAVENOUS at 15:13:00

## 2023-06-28 NOTE — H&P
ORTHOPEDIC SURGERY H&P    Patient Name:Cristhian Nazario  Date of Appointment: 6/28/2023    Chief Complaint: Patient presents with:  Right Shoulder - Follow - Up    HPI:   The patient is a 58year old male 8 weeks status post right shoulder arthroscopy, rotator cuff repair, and subacromial decompression on 4/26/2023 and now 2 weeks out from right anterior shoulder incision and drainage. He was doing well following surgery until the day before his follow-up exam last week. He started noticing a confluence in the lateral aspect of his shoulder that has since progressed and now is draining purulent fluid from his prior lateral portal site incision. He has no recurrence of symptoms or issues on his anterior site of prior I&D. He continues to have reflux of fluid from the pinhead area of rupture of the lateral shoulder. Physical Exam:     There were no vitals taken for this visit. No gross deformity, lateral and posterior surgical incisions well-appearing and appear to have healed appropriately. Anterior half of the lateral incision has 1 mm pinhead opening with expression of purulent fluid. There is expressible efflux of yellowish and some purulent fluid. No significant surrounding erythema. He has no pain with passive range of motion of the right shoulder. No pain with short arcs. He is neurovascularly intact distally. Diagnostic Studies:     No new x-rays    Assessment:     58year old year old male presents with the following diagnoses:    1. Visit for wound check  - [facility] SURG SCHED    2. Surgical site infection  - [facility] SURG SCHED    3.  Superficial injury of right shoulder and upper arm with infection, sequela  - [facility] SURG SCHED    Plan:     Plan:   - To OR for right shoulder lateral portal incision and drainage and arthroscopic shoulder irrigation and debridement  - Will plan to admit post-op for ID consult and planning antibiotic regimen    Miguel Angel Kelly MD  [address] [place] [address] Surgery, Sports Medicine

## 2023-06-28 NOTE — BRIEF OP NOTE
Pre-Operative Diagnosis: RIGHT SHOULDER SURGICAL SITE INFECTION     Post-Operative Diagnosis: RIGHT SHOULDER SURGICAL SITE INFECTION      Procedure Performed:   RIGHT SHOULDER ARTHROSCOPIC  IRRIGATION AND DEBRIDEMENT, INCISION AND DRAINAGE OF SUPERFICIAL ABSCESS     Surgeon(s) and Role:     Fadumo Huynh MD - Primary    Assistant(s):  PA: Aaron Fofana PA-C     Surgical Findings: See detailed operative report     Specimen: None     Estimated Blood Loss: Blood Output: 35 mL (6/28/2023  4:21 PM)    Kesha Chung MD  6/28/2023  5:07 PM

## 2023-06-28 NOTE — OPERATIVE REPORT
Pre-Operative Diagnosis: RIGHT SHOULDER SURGICAL SITE INFECTION     Post-Operative Diagnosis: RIGHT SHOULDER SURGICAL SITE INFECTION      Procedure Performed:   RIGHT SHOULDER ARTHROSCOPIC  IRRIGATION AND DEBRIDEMENT, INCISION AND DRAINAGE OF SUPERFICIAL ABSCESS      Surgeon(s) and Role:     * Juan Antonio MD - Primary     Assistant(s):  PA: Gama Rubio PA-C    Skilled assistance was needed for patient positioning, prepping and draping, instrument holding and passing, retracting, and suturing. Specimen: Aerobic cultures of superficial abscess sent     Estimated Blood Loss: Blood Output: 35 mL (6/28/2023  4:21 PM)    Indication for Procedure: The patient is a 76-year-old male who previously underwent right shoulder arthroscopic rotator cuff repair on April 26 of this year. He was doing well until 4-1/2 weeks out from surgery where he was found to have developed a confluence of superficial fluid from the anterior incision that then progressed and became a draining area. He subsequently underwent right shoulder open irrigation and debridement on 6/7/2023 with interval improvement in shoulder exam.  He was seen at his 2-week follow-up and stated he started to develop confluence of fluid again now settled around the lateral incision approximately 2 weeks out from this revision surgery. He denied any systemic symptoms and this then progressed 2 days later to develop progression of purulent discharge from the lateral incision. With persistent draining fluid we discussed ongoing management at this time given the recurrence of the superficial infection we discussed an open irrigation debridement of the area as well as extension deep with an arthroscopic irrigation and debridement of the shoulder joint as well as the subacromial space. Risks, benefits, and alternatives to surgery were discussed in detail with the patient.   The patient agreed with proceeding with operative intervention and informed consent was obtained. Operative summary:   The patient was met in the preoperative holding area where the correct site, correct site, correct patient and correct procedure were identified and marked. He was taken to the operating room placed under general anesthesia. The patient was placed in a lateral decubitus position and all bony prominences were padded appropriately. The patient was prepped and draped in the usual sterile fashion. A timeout was performed in accordance with WHO standards. An incision of the superficial abscess that involved the lateral portal was performed. This involved 1 mm pinhead area of the most anterior aspect of the lateral arthroscopic portal.  This area of dehiscence was extended both posteriorly and anteriorly to involve the entire prior lateral portal site at a total length of approximately 2 and half centimeters. Noberto Dykes purulent discharge was encountered and expressible that was minding anteriorly and superiorly along the undersurface of the subcutaneous tissue overlying the deltoid. This tissue was sent for culture. Prophylactic antibiotics were then administered. The area was bluntly dissected to evaluate for areas of potential space and they did appear to be tracking and undermining subcutaneous tissue along this plane. There did not appear to be any disruption of the fascia or any evidence of tracking deeply within the shoulder. There was a small area of necrotic tissue that was debrided. At this time the tissue bed was then bluntly dissected with the use of curette, rongeur, and electrocautery down to healthy appearing base. The incisional area was then copiously irrigated with 6 L of normal saline. Following extensive saline irrigation the air was significant improvement in the overall appearance and tissue bed quality of the abscess area. At this time attention was then turned to the shoulder joint.     Given the recurrence of infection, a deep irrigation and debridement was performed. Using prior posterior arthroscopic portal, and arthroscope was introduced into the glenohumeral joint. Overall this was well-appearing without of any evidence of injury to the humeral head or glenoid. There were areas of erythematous tissue, however, no gross purulent discharge was evaluated or seen. The anterior portal was then established an outside in technique and shaver was placed through the anterior portal.  The glenohumeral joint was lavaged and evaluation demonstrated no recurrence of injury to the previously repaired structures. There was grossly intact repair of the rotator cuff without focal areas of dehiscence or retear. The shaver was used to debride areas of mildly fibrinous tissue, however, overall the joint was well-appearing. The joint was then irrigated and lavaged with the use of 3 L of normal saline. Attention was then turned to the subacromial space. The arthroscope was then transitioned into the subacromial space. With inflow of fluid into both the intra-articular and subacromial space there was no efflux out the previously established lateral incision. The shaver was introduced through the lateral portal under direct visualization. The subacromial space and overall appearance was appropriate and consistent with postsurgical changes. There is mild scar tissue present to the subacromial space, however, no gross purulent fluid was encountered. The subacromial space was then debrided with the use of shaver and the previously visualized rotator cuff repair again from the subacromial space appeared to remain intact. The subacromial space was then irrigated with 3 L of normal saline. After irrigation the overall appearance of tissue was appropriate. At this point attention was turned to closure. All arthroscopic fluid was suctioned out as best able. The remaining lateral incision was again irrigated copiously.   A 15 Arabic round drain was then placed into the anterior abscess pocket and placed to bulb suction. The anterior and posterior arthroscopic portals were closed with the use of 3-0 nylon. The lateral portal was closed in a layered fashion with 3-0 Monocryl and 3-0 nylon. Sterile dressing was then placed and the patient was awakened from anesthesia without any immediately apparent complications. He will be admitted to observation and started on broad-spectrum IV antibiotics under the direction of the infectious disease service as we wait on culture speciation and formulation of a antibiotic plan.

## 2023-06-29 VITALS
HEART RATE: 68 BPM | DIASTOLIC BLOOD PRESSURE: 78 MMHG | OXYGEN SATURATION: 92 % | TEMPERATURE: 99 F | BODY MASS INDEX: 33.18 KG/M2 | SYSTOLIC BLOOD PRESSURE: 114 MMHG | RESPIRATION RATE: 18 BRPM | WEIGHT: 245 LBS | HEIGHT: 72 IN

## 2023-06-29 LAB
HCT VFR BLD AUTO: 45.7 %
HGB BLD-MCNC: 14.7 G/DL

## 2023-06-29 PROCEDURE — 85018 HEMOGLOBIN: CPT | Performed by: ORTHOPAEDIC SURGERY

## 2023-06-29 PROCEDURE — 85014 HEMATOCRIT: CPT | Performed by: ORTHOPAEDIC SURGERY

## 2023-06-29 RX ORDER — CEFPODOXIME PROXETIL 200 MG/1
400 TABLET, FILM COATED ORAL 2 TIMES DAILY
Qty: 56 TABLET | Refills: 0 | Status: SHIPPED | OUTPATIENT
Start: 2023-06-29 | End: 2023-07-13

## 2023-06-29 RX ORDER — DOXYCYCLINE HYCLATE 100 MG/1
100 CAPSULE ORAL 2 TIMES DAILY
Qty: 28 CAPSULE | Refills: 0 | Status: SHIPPED | OUTPATIENT
Start: 2023-06-29 | End: 2023-07-13

## 2023-06-29 RX ORDER — DOXYCYCLINE HYCLATE 100 MG/1
100 CAPSULE ORAL EVERY 12 HOURS SCHEDULED
Status: DISCONTINUED | OUTPATIENT
Start: 2023-06-29 | End: 2023-06-29

## 2023-06-29 RX ORDER — DOXYCYCLINE HYCLATE 100 MG/1
100 CAPSULE ORAL EVERY 12 HOURS SCHEDULED
Qty: 28 CAPSULE | Refills: 0 | Status: SHIPPED | OUTPATIENT
Start: 2023-06-29 | End: 2023-07-13

## 2023-06-29 NOTE — DISCHARGE INSTRUCTIONS
Whitney Donato MD  [facility]  Orthopaedic Surgery - Sports Medicine        Post Operative Instructions: Shoulder Arthroscopy, Debridement and I&D     SLING / MOVEMENT  You were provided with a sling following surgery to provide support to the arm following the nerve block that was placed to help with post-operative pain. Once you have regained control of the arm and the nerve block has completely worn off, you can remove the sling and move the shoulder, elbow, wrist and hand as you can tolerate. Physical therapy will begin approximately 2 weeks post-op following your first post-op visit. ICE  You should use ice packs over the surgical site regularly throughout the day to help decrease swelling and pain. Make sure to have a layer between the ice and your knee so as to not injury your skin. Icing should be performed at intervals of 20 minutes on and 20 minutes off. MEDICATIONS  Nearly all patients will receive a nerve block for pain control immediately following surgery. It will wear off over 18-24 hours. During this time, you will have little to no feeling in the body part where you had surgery (i.e. the arm). To control your pain during the transition while the nerve block is wearing off, you should start the use of your pain medication, Norco, as you feel is needed. 24 hours after surgery, you should supplement your pain medication with the anti-inflammatory that was prescribed for you. This can help you wean from the narcotic pain medication. An anti-nausea medication, Zofran, was prescribed for you and should be taken on an as needed basis, as the pain medication can cause nausea. To minimize this side effect, you should take your pain medication with some food. DRESSING / BANDAGES:  Keep your surgical dressing clean and dry. You may remove your bandages three days after surgery. Please place waterproof band-aids over the incision sites.  At this time, you may take a shower, however, you should avoid direct contact to the incisions. Please keep the incisions clean and dry. Do no scrub the incision sites with soap or apply lotion to the operative area. Keep the incisions clean and dry. Do not take a bath or submerge your shoulder in water until your incisions are checked by me at your post-operative appointment and fully healed with all stitches removed. This is typically 2-3 weeks after surgery. TEMPERATURE  It is normal to have an elevated temperature during the first 2-3 days post-operatively. Please call our office if your temperature is above 101F, if there is increased redness around the incision sites, or if there is increased drainage from the incision sites. APPOINTMENT  It is likely that my surgical scheduler, Skye Gillis, has already scheduled a post-operative visit for you. This should occur 2 weeks after surgery. At that visit you will be given a prescription for physical therapy.

## 2023-06-29 NOTE — PROGRESS NOTES
Patient comfortable with activity and ADLs and denies need for PT/OT. Okay to discontinue PT/OT per Dr. Nadir Eisenberg. Discharge AVS reviewed with patient. All questions answered. IV removed. Bedside belongings packed up and returned to patient.

## 2023-06-29 NOTE — PLAN OF CARE
Patient A & O x4. VSS, on RA. Dressing to right shoulder is clean, dry and intact. LUCAS drain to suction with small amount of output. RUE in sling. Denies pain/N/T. Voiding freely. Safety measures in place. Instructed to use call light.

## 2023-06-29 NOTE — PLAN OF CARE
Post-op day 0. Dressing is clean, dry, and intact. LUCAS drain in place with no drainage. Sling in place. Alert and oriented x4. Educated on incentive spirometer. PT/OT evaluations scheduled for 6/28. Up with min assist. Plan is to discharge home when medically stable.

## 2023-07-27 ENCOUNTER — OFFICE VISIT (OUTPATIENT)
Dept: FAMILY MEDICINE CLINIC | Facility: CLINIC | Age: 63
End: 2023-07-27
Payer: COMMERCIAL

## 2023-07-27 VITALS
RESPIRATION RATE: 16 BRPM | HEIGHT: 72 IN | HEART RATE: 76 BPM | DIASTOLIC BLOOD PRESSURE: 70 MMHG | SYSTOLIC BLOOD PRESSURE: 112 MMHG | WEIGHT: 249.19 LBS | BODY MASS INDEX: 33.75 KG/M2

## 2023-07-27 DIAGNOSIS — M25.511 CHRONIC RIGHT SHOULDER PAIN: Primary | ICD-10-CM

## 2023-07-27 DIAGNOSIS — G89.29 CHRONIC RIGHT SHOULDER PAIN: Primary | ICD-10-CM

## 2023-07-27 PROBLEM — M75.101 ROTATOR CUFF SYNDROME OF RIGHT SHOULDER: Status: ACTIVE | Noted: 2023-02-23

## 2023-07-27 PROCEDURE — 99213 OFFICE O/P EST LOW 20 MIN: CPT | Performed by: FAMILY MEDICINE

## 2023-07-27 PROCEDURE — 3008F BODY MASS INDEX DOCD: CPT | Performed by: FAMILY MEDICINE

## 2023-07-27 PROCEDURE — 3074F SYST BP LT 130 MM HG: CPT | Performed by: FAMILY MEDICINE

## 2023-07-27 PROCEDURE — 3078F DIAST BP <80 MM HG: CPT | Performed by: FAMILY MEDICINE

## 2023-07-27 RX ORDER — TADALAFIL 10 MG/1
20 TABLET ORAL AS NEEDED
Qty: 30 TABLET | Refills: 5 | Status: SHIPPED | OUTPATIENT
Start: 2023-07-27 | End: 2023-09-25

## 2023-08-25 RX ORDER — PANTOPRAZOLE SODIUM 40 MG/1
40 TABLET, DELAYED RELEASE ORAL
Qty: 180 TABLET | Refills: 1 | Status: SHIPPED | OUTPATIENT
Start: 2023-08-25

## 2023-08-25 NOTE — TELEPHONE ENCOUNTER
Refill request for:    Requested Prescriptions     Pending Prescriptions Disp Refills    PANTOPRAZOLE 40 MG Oral Tab EC [Pharmacy Med Name: PANTOPRAZOLE SOD DR 40 MG TAB] 180 tablet 1     Sig: TAKE 1 TABLET BY MOUTH 2 TIMES DAILY BEFORE MEALS. Last Prescribed Quantity Refills   2/21/22 180 1     LOV 7/27/2023     Patient was asked to follow-up in: 4 months    Appointment due: November 2023    Appointment scheduled: Visit date not found    Medication not on protocol.

## 2023-10-02 ENCOUNTER — TELEPHONE (OUTPATIENT)
Dept: FAMILY MEDICINE CLINIC | Facility: CLINIC | Age: 63
End: 2023-10-02

## 2023-10-02 DIAGNOSIS — Z00.00 LABORATORY EXAMINATION ORDERED AS PART OF A ROUTINE GENERAL MEDICAL EXAMINATION: ICD-10-CM

## 2023-10-02 DIAGNOSIS — Z13.0 SCREENING FOR IRON DEFICIENCY ANEMIA: ICD-10-CM

## 2023-10-02 DIAGNOSIS — Z13.1 SCREENING FOR DIABETES MELLITUS: Primary | ICD-10-CM

## 2023-10-02 DIAGNOSIS — Z13.6 SCREENING FOR CARDIOVASCULAR CONDITION: ICD-10-CM

## 2023-10-02 DIAGNOSIS — E78.5 DYSLIPIDEMIA: ICD-10-CM

## 2023-10-02 DIAGNOSIS — Z13.29 SCREENING FOR THYROID DISORDER: ICD-10-CM

## 2023-10-02 NOTE — TELEPHONE ENCOUNTER
Please enter lab orders for the patient's upcoming physical appointment. Physical scheduled: Your appointments       Date & Time Appointment Department [facility])    Dec 20, 2023  8:30 AM CST Physical - Established with Pricila Bowens MD [address], [address] [place] [phone], [place] (Kassi Soto)              [facility] [address] [address] [facility] [phone]           Preferred lab: [facility]     The patient has been notified to complete fasting labs prior to their physical appointment.

## 2023-12-14 NOTE — TELEPHONE ENCOUNTER
1. Screening for diabetes mellitus (Primary)  -     Comp Metabolic Panel (14)  -     Hemoglobin A1C  2. Screening for iron deficiency anemia  -     CBC With Differential With Platelet  3. Screening for thyroid disorder  -     TSH W Reflex To Free T4  4. Screening for cardiovascular condition  -     Lipid Panel  5. Laboratory examination ordered as part of a routine general medical examination  -     TSH W Reflex To Free T4  -     Lipid Panel  -     CBC With Differential With Platelet  -     Comp Metabolic Panel (14)  -     Hemoglobin A1C  6. Dyslipidemia  -     TSH W Reflex To Free T4  -     Lipid Panel       OK to notify.  Thanks, Bg Kaye MD

## 2023-12-19 LAB
ABSOLUTE BASOPHILS: 53 CELLS/UL (ref 0–200)
ABSOLUTE EOSINOPHILS: 152 CELLS/UL (ref 15–500)
ABSOLUTE LYMPHOCYTES: 1690 CELLS/UL (ref 850–3900)
ABSOLUTE MONOCYTES: 455 CELLS/UL (ref 200–950)
ABSOLUTE NEUTROPHILS: 4250 CELLS/UL (ref 1500–7800)
ALBUMIN/GLOBULIN RATIO: 1.6 (CALC) (ref 1–2.5)
ALBUMIN: 4.2 G/DL (ref 3.6–5.1)
ALKALINE PHOSPHATASE: 61 U/L (ref 35–144)
ALT: 13 U/L (ref 9–46)
AST: 17 U/L (ref 10–35)
BASOPHILS: 0.8 %
BILIRUBIN, TOTAL: 0.4 MG/DL (ref 0.2–1.2)
BUN: 15 MG/DL (ref 7–25)
CALCIUM: 9.2 MG/DL (ref 8.6–10.3)
CARBON DIOXIDE: 30 MMOL/L (ref 20–32)
CHLORIDE: 107 MMOL/L (ref 98–110)
CHOL/HDLC RATIO: 4.6 (CALC)
CHOLESTEROL, TOTAL: 211 MG/DL
CREATININE: 0.97 MG/DL (ref 0.7–1.35)
EGFR: 87 ML/MIN/1.73M2
EOSINOPHILS: 2.3 %
GLOBULIN: 2.6 G/DL (CALC) (ref 1.9–3.7)
GLUCOSE: 83 MG/DL (ref 65–99)
HDL CHOLESTEROL: 46 MG/DL
HEMATOCRIT: 49.3 % (ref 38.5–50)
HEMOGLOBIN A1C: 5.4 % OF TOTAL HGB
HEMOGLOBIN: 16.5 G/DL (ref 13.2–17.1)
LDL-CHOLESTEROL: 143 MG/DL (CALC)
LYMPHOCYTES: 25.6 %
MCH: 30.3 PG (ref 27–33)
MCHC: 33.5 G/DL (ref 32–36)
MCV: 90.5 FL (ref 80–100)
MONOCYTES: 6.9 %
MPV: 10.6 FL (ref 7.5–12.5)
NEUTROPHILS: 64.4 %
NON-HDL CHOLESTEROL: 165 MG/DL (CALC)
PLATELET COUNT: 237 THOUSAND/UL (ref 140–400)
POTASSIUM: 4.7 MMOL/L (ref 3.5–5.3)
PROTEIN, TOTAL: 6.8 G/DL (ref 6.1–8.1)
RDW: 13.9 % (ref 11–15)
RED BLOOD CELL COUNT: 5.45 MILLION/UL (ref 4.2–5.8)
SODIUM: 142 MMOL/L (ref 135–146)
TRIGLYCERIDES: 104 MG/DL
TSH W/REFLEX TO FT4: 1.58 MIU/L (ref 0.4–4.5)
WHITE BLOOD CELL COUNT: 6.6 THOUSAND/UL (ref 3.8–10.8)

## 2023-12-19 NOTE — ASSESSMENT & PLAN NOTE
Cholesterol shows Good control. Long term heart-healthy diet and lifestyle discussed and encouraged to reduce risk of cardiovascular disease. Cholesterol: 223, done on 12/16/2022. HDL Cholesterol: 52, done on 12/16/2022. TriGlycerides 90, done on 12/16/2022. LDL Cholesterol: 151, done on 12/16/2022. No current Cholesterol medication. Discussed 11.9% risk, he declined stating despite this.  Therapuetic Lifestyle Change disucssed

## 2023-12-20 ENCOUNTER — OFFICE VISIT (OUTPATIENT)
Dept: FAMILY MEDICINE CLINIC | Facility: CLINIC | Age: 63
End: 2023-12-20
Payer: COMMERCIAL

## 2023-12-20 VITALS
HEIGHT: 72 IN | WEIGHT: 253.38 LBS | DIASTOLIC BLOOD PRESSURE: 74 MMHG | BODY MASS INDEX: 34.32 KG/M2 | RESPIRATION RATE: 14 BRPM | SYSTOLIC BLOOD PRESSURE: 122 MMHG | HEART RATE: 70 BPM

## 2023-12-20 DIAGNOSIS — Z00.00 ANNUAL PHYSICAL EXAM: Primary | ICD-10-CM

## 2023-12-20 DIAGNOSIS — E78.2 MIXED HYPERLIPIDEMIA: ICD-10-CM

## 2023-12-20 DIAGNOSIS — K21.9 GASTROESOPHAGEAL REFLUX DISEASE, UNSPECIFIED WHETHER ESOPHAGITIS PRESENT: ICD-10-CM

## 2023-12-20 DIAGNOSIS — F33.0 MILD EPISODE OF RECURRENT MAJOR DEPRESSIVE DISORDER (HCC): ICD-10-CM

## 2023-12-20 PROBLEM — M25.511 RIGHT SHOULDER PAIN: Status: ACTIVE | Noted: 2023-10-11

## 2023-12-20 PROCEDURE — 99396 PREV VISIT EST AGE 40-64: CPT | Performed by: FAMILY MEDICINE

## 2023-12-20 PROCEDURE — 3008F BODY MASS INDEX DOCD: CPT | Performed by: FAMILY MEDICINE

## 2023-12-20 PROCEDURE — 3078F DIAST BP <80 MM HG: CPT | Performed by: FAMILY MEDICINE

## 2023-12-20 PROCEDURE — 3074F SYST BP LT 130 MM HG: CPT | Performed by: FAMILY MEDICINE

## 2024-02-12 ENCOUNTER — OFFICE VISIT (OUTPATIENT)
Dept: FAMILY MEDICINE CLINIC | Facility: CLINIC | Age: 64
End: 2024-02-12
Payer: COMMERCIAL

## 2024-02-12 VITALS
TEMPERATURE: 98 F | WEIGHT: 251 LBS | HEIGHT: 73 IN | SYSTOLIC BLOOD PRESSURE: 118 MMHG | RESPIRATION RATE: 16 BRPM | HEART RATE: 76 BPM | BODY MASS INDEX: 33.27 KG/M2 | DIASTOLIC BLOOD PRESSURE: 82 MMHG | OXYGEN SATURATION: 96 %

## 2024-02-12 DIAGNOSIS — J01.00 ACUTE NON-RECURRENT MAXILLARY SINUSITIS: Primary | ICD-10-CM

## 2024-02-12 RX ORDER — AMOXICILLIN AND CLAVULANATE POTASSIUM 875; 125 MG/1; MG/1
1 TABLET, FILM COATED ORAL 2 TIMES DAILY
Qty: 14 TABLET | Refills: 0 | Status: SHIPPED | OUTPATIENT
Start: 2024-02-12 | End: 2024-02-19

## 2024-02-13 NOTE — PROGRESS NOTES
CHIEF COMPLAINT:     Chief Complaint   Patient presents with    Sinus Problem     3-4 days, sinus pressure, ears clogged/pressure, swollen glands, + covid 2/2       HPI:   Cristhian Nazario is a 64 year old male who presents for sinus congestion. Patient tested positive for covid 10 days ago and has had worsening sinus pain/pressure for the past 3-4 days. Sinus congestion/pain is described as a pressure and is located mainly at cheeks.  Patient reports swollen glands in neck area, + sinus headache, purulent nasal discharge, post nasal drainage, no cough, no sore throat, + fullness in ears.  Denies fever, chills, dental pain, or tinnitus.    Has treated symptoms with OTC decongestant.       Current Outpatient Medications   Medication Sig Dispense Refill    amoxicillin clavulanate 875-125 MG Oral Tab Take 1 tablet by mouth 2 (two) times daily for 7 days. 14 tablet 0    pantoprazole 40 MG Oral Tab EC Take 1 tablet (40 mg total) by mouth 2 (two) times daily before meals. 180 tablet 1    Tadalafil 10 MG Oral Tab Take 2 tablets (20 mg total) by mouth as needed for Erectile Dysfunction. 1 hour prior to intercourse 30 tablet 5    tamsulosin 0.4 MG Oral Cap Take 1 capsule (0.4 mg total) by mouth daily. (Patient not taking: Reported on 2/12/2024)        Past Medical History:   Diagnosis Date    Chest pain, unspecified 06/30/2011    Esophageal reflux     Other and unspecified hyperlipidemia     Pure hypercholesterolemia     Special screening for malignant neoplasm of prostate     Unspecified vitamin D deficiency     Visual impairment     GLASSES FOR DISTANCE      Past Surgical History:   Procedure Laterality Date    ANESTH,KNEE ARTHROSCOPY  08/01/2006    ANESTH,KNEE ARTHROSCOPY  01/01/1982    Right    APPENDECTOMY      As a child    ROTATOR CUFF REPAIR Right 04/26/2023      Family History   Problem Relation Age of Onset    Diabetes Son         DM      Social History     Socioeconomic History    Marital status:     Tobacco Use    Smoking status: Never    Smokeless tobacco: Never    Tobacco comments:     Denied current smoker   Vaping Use    Vaping Use: Never used   Substance and Sexual Activity    Alcohol use: No     Alcohol/week: 0.0 standard drinks of alcohol    Drug use: No   Other Topics Concern    Caffeine Concern Yes     Comment: coca cola 1-2 cans per day    Exercise Yes     Comment: ice hockey ref,5 hours of skating a week, walking, often    Seat Belt Yes         REVIEW OF SYSTEMS:   GENERAL: feels well otherwise,  ok appetite  HEENT: See HPI.    LUNGS: denies shortness of breath or wheezing, See HPI  CARDIOVASCULAR: denies chest pain or palpitations   NEURO: + sinus headaches.  No numbness or tingling in face.    EXAM:   /82   Pulse 76   Temp 97.5 °F (36.4 °C)   Resp 16   Ht 6' 1\" (1.854 m)   Wt 251 lb (113.9 kg)   SpO2 96%   BMI 33.12 kg/m²   GENERAL: well developed, well nourished, in no apparent distress  HEAD: atraumatic, normocephalic,  + tenderness on palpation of maxillary sinuses  EYES: conjunctiva clear; EOMI.  EARS: TM's clear gray, no bulging, no retraction, no fluid. Bony landmarks sharp bilaterally  NOSE: nostrils patent, + nasal mucous, nasal mucosa reddened and swollen  THROAT: oral mucosa pink, moist.  Posterior pharynx is not erythematous. No exudates.  LUNGS: Breathing is non labored; clear to auscultation bilaterally.   CARDIO: RRR without murmur  LYMPH:  + shotty ant cervical lymphadenopathy.   NEURO: No focal deficits   ASSESSMENT AND PLAN:   ASSESSMENT:  Cristhian Nazario is a 64 year old male who presents with    ASSESSMENT:   Encounter Diagnosis   Name Primary?    Acute non-recurrent maxillary sinusitis Yes       PLAN: Meds and instructions as listed below. Risks, benefits, complications and side effects of meds discussed with patient.   OTC meds as needed. Push fluids; Comfort measures discussed with patient.  Follow up with PCP in 5 days if not improving, condition worsens, or  fever greater than or equal to 100.4 persists for 72 hours.    Verbalizes understanding of these issues and agrees to the plan.      Meds & Refills for this Visit:  Requested Prescriptions     Signed Prescriptions Disp Refills    amoxicillin clavulanate 875-125 MG Oral Tab 14 tablet 0     Sig: Take 1 tablet by mouth 2 (two) times daily for 7 days.           There are no Patient Instructions on file for this visit.

## 2024-02-27 RX ORDER — PANTOPRAZOLE SODIUM 40 MG/1
40 TABLET, DELAYED RELEASE ORAL
Qty: 180 TABLET | Refills: 1 | Status: SHIPPED | OUTPATIENT
Start: 2024-02-27

## 2024-02-27 NOTE — TELEPHONE ENCOUNTER
Refill request for:    Requested Prescriptions     Pending Prescriptions Disp Refills    PANTOPRAZOLE 40 MG Oral Tab EC [Pharmacy Med Name: PANTOPRAZOLE SOD DR 40 MG TAB] 180 tablet 1     Sig: TAKE 1 TABLET BY MOUTH 2 TIMES DAILY BEFORE MEALS.        Last Prescribed Quantity Refills   8/25/23 180 1     LOV 12/20/2023     Patient was asked to follow-up in: 6 months    Appointment scheduled: Visit date not found    Medication not on protocol.     # 180 with 1 refills routed to Jhonny Rain MD for review

## 2024-04-22 NOTE — PLAN OF CARE
Alert and oriented x 4. Vitals stable on room air. Denies pain. Right shoulder drain removed, coverlet's applied, sling in place. Voiding without difficulty. Last BM 6/28/23, declined Miralax at this time. Tolerating regular diet. Safety precautions in place. Plan of care discussed with patient. Spoke to patient and informed him to call [phone] and tell them he needs a primary care physician.

## 2024-06-03 ENCOUNTER — TELEPHONE (OUTPATIENT)
Dept: FAMILY MEDICINE CLINIC | Facility: CLINIC | Age: 64
End: 2024-06-03

## 2024-06-03 NOTE — TELEPHONE ENCOUNTER
Patient tested Positive for Covid on Friday may 31 patient stated he has cough, running noise.  Patient was offer a video appointment he decline, he will call back if needed appointment just let the Dr Rain to knows.

## 2024-06-04 ENCOUNTER — TELEMEDICINE (OUTPATIENT)
Dept: FAMILY MEDICINE CLINIC | Facility: CLINIC | Age: 64
End: 2024-06-04
Payer: COMMERCIAL

## 2024-06-04 DIAGNOSIS — U07.1 COVID-19: Primary | ICD-10-CM

## 2024-06-04 PROCEDURE — 99213 OFFICE O/P EST LOW 20 MIN: CPT | Performed by: STUDENT IN AN ORGANIZED HEALTH CARE EDUCATION/TRAINING PROGRAM

## 2024-06-04 NOTE — PROGRESS NOTES
Telemedicine video encounter documentation    This video encounter was conducted with the patient's (or proxy's) verbal consent via secure, interactive audio and video telecommunications.      The patient (or proxy) was instructed to have this encounter in a suitably private space and to only have persons present to whom they give permission to participate. In addition, patient identity was confirmed by use of name plus two identifiers.      Chief Complaint:     COVID19    Subjective:     Cristhian Nazario is a 64 year old male with history of HLD, tremor, GERD, arthritis, ED, established patient. Video visit today for:    1. COVID19 +. Symptoms started Friday with mild cough. He thought it was allergies. Today is day #4 of illness. He was having fatigue. Mild symtpoms. In the last day he has noticed sinus pain, sore throat and ear pressure. Notes that last time he had sinus infection was in October and he developed sinusitis. His glands are swollen. Sore throat. Ear pain and pressure. Lungs are fine. Breathing comfortably. He has an oxygen meter and oxygen is good 98-99. He notes pressure in his head and ears. He travels for business, travelling next week. He prefers not to take paxlovid because his wife had rebound symptoms/positive test after taking it. No fevers.     LFT normal. GFR 87, Cr 0.97 in 12/2023      Patient Active Problem List   Diagnosis    Mixed hyperlipidemia    Allergic rhinitis    Hemorrhoids    Memory loss    Depression    Tremor of right hand    ED (erectile dysfunction)    GERD (gastroesophageal reflux disease)    Arthralgia of multiple sites    Rosacea    Arthritis of multiple sites    Onychomycosis of toenail    Abscess of right shoulder    Rotator cuff syndrome of right shoulder    Right shoulder pain       Outpatient Medications Prior to Visit   Medication Sig Dispense Refill    pantoprazole 40 MG Oral Tab EC Take 1 tablet (40 mg total) by mouth 2 (two) times daily before meals. 180 tablet  1    Tadalafil 10 MG Oral Tab Take 2 tablets (20 mg total) by mouth as needed for Erectile Dysfunction. 1 hour prior to intercourse 30 tablet 5    tamsulosin 0.4 MG Oral Cap Take 1 capsule (0.4 mg total) by mouth daily. (Patient not taking: Reported on 2/12/2024)       No facility-administered medications prior to visit.       Social History and Allergies reviewed.    ROS   See HPI for other ROS    Objective:     Vitals as reported by patient:    Constitutional: well-apearing  Mental status: alert and oriented  Respiratory: no labored breathing, speaks in full sentences    Assessment/Plan:     Diagnoses and all orders for this visit:    COVID-19      Mild illness, doing well, no red flags. He notes 1 day of sinusitis symptoms, at this point likely viral but call back precautions for new fevers or sinus pain that persists x7-10 days as abx would be indicated. Declines paxlovid  MDM    No follow-ups on file.      There are no Patient Instructions on file for this visit.

## 2024-07-27 LAB — AMB EXT COVID-19 RESULT: DETECTED

## 2024-07-29 ENCOUNTER — TELEPHONE (OUTPATIENT)
Dept: FAMILY MEDICINE CLINIC | Facility: CLINIC | Age: 64
End: 2024-07-29

## 2024-07-29 NOTE — TELEPHONE ENCOUNTER
Patient reports he tested positive for covid again. Had covid early June 2024.   He is c/o HA, runny nose, ear pain, sore throat, cough since Thursday.   Tested positive for covid on Saturday night. Has been taking Sudafed. He feels his sx are starting to improve. Will continue to monitor. OTC meds and supportive care. Will call office if sx worsen or fail to improve. He verbalized understanding and agrees with plan.

## 2024-07-29 NOTE — TELEPHONE ENCOUNTER
Patient call because last Saturday test Covid Positive with a home Covid Test.    Running nose , cough, ear pressure .    Not fever.    Request medication and a nurse to call back.    [facility]/pharmacy #[id number] - [place], [place] - [address] [phone], [phone]   [address] [facility] [address]   Phone: [phone] Fax: [phone]   Hours: Not open 24 hours

## 2024-11-01 ENCOUNTER — OFFICE VISIT (OUTPATIENT)
Dept: FAMILY MEDICINE CLINIC | Facility: CLINIC | Age: 64
End: 2024-11-01
Payer: COMMERCIAL

## 2024-11-01 VITALS
WEIGHT: 246.63 LBS | TEMPERATURE: 98 F | HEIGHT: 73 IN | DIASTOLIC BLOOD PRESSURE: 66 MMHG | SYSTOLIC BLOOD PRESSURE: 106 MMHG | HEART RATE: 72 BPM | RESPIRATION RATE: 18 BRPM | BODY MASS INDEX: 32.69 KG/M2 | OXYGEN SATURATION: 96 %

## 2024-11-01 DIAGNOSIS — J01.00 ACUTE NON-RECURRENT MAXILLARY SINUSITIS: Primary | ICD-10-CM

## 2024-11-01 PROCEDURE — 3008F BODY MASS INDEX DOCD: CPT | Performed by: FAMILY MEDICINE

## 2024-11-01 PROCEDURE — 99213 OFFICE O/P EST LOW 20 MIN: CPT | Performed by: FAMILY MEDICINE

## 2024-11-01 PROCEDURE — 3074F SYST BP LT 130 MM HG: CPT | Performed by: FAMILY MEDICINE

## 2024-11-01 PROCEDURE — 3078F DIAST BP <80 MM HG: CPT | Performed by: FAMILY MEDICINE

## 2024-11-01 RX ORDER — FLUTICASONE PROPIONATE 50 MCG
2 SPRAY, SUSPENSION (ML) NASAL DAILY
Qty: 1 EACH | Refills: 3 | Status: SHIPPED | OUTPATIENT
Start: 2024-11-01

## 2024-11-01 NOTE — PROGRESS NOTES
CHIEF COMPLAINT:     Chief Complaint   Patient presents with    Cough     Cough, right ear pain, runny nose, and sinus pressure. For 6 days   OTC:Sudafed    3 neg at home covid test        HPI:   Cristhian Nazario is a 64 year old male who presents for sinus congestion for  6  days. Symptoms have been worsening since onset. Sinus congestion/pain is described as a pressure and is located mainly in the maxillary sinuses.  Reports persistent nasal discharge. Has treated symptoms with otc meds without relief.  Patient also reports headache, cough, fullness in ears, sore throat.  Denies fever, dental pain, tinnitus, N/V/D.      Pt reports travel to and from [place] after onset of symptoms.   Will be traveling to [place] next week for work .  Current Outpatient Medications   Medication Sig Dispense Refill    amoxicillin clavulanate 875-125 MG Oral Tab Take 1 tablet by mouth 2 (two) times daily for 10 days. 20 tablet 0    fluticasone propionate 50 MCG/ACT Nasal Suspension 2 sprays by Each Nare route daily. 1 each 3    pantoprazole 40 MG Oral Tab EC Take 1 tablet (40 mg total) by mouth 2 (two) times daily before meals. 180 tablet 1    Tadalafil 10 MG Oral Tab Take 2 tablets (20 mg total) by mouth as needed for Erectile Dysfunction. 1 hour prior to intercourse 30 tablet 5    tamsulosin 0.4 MG Oral Cap Take 1 capsule (0.4 mg total) by mouth daily. (Patient not taking: Reported on 2/12/2024)        Past Medical History:    Chest pain, unspecified    Esophageal reflux    Other and unspecified hyperlipidemia    Pure hypercholesterolemia    Special screening for malignant neoplasm of prostate    Unspecified vitamin D deficiency    Visual impairment    GLASSES FOR DISTANCE      Past Surgical History:   Procedure Laterality Date    Anesth,knee arthroscopy  08/01/2006    Anesth,knee arthroscopy  01/01/1982    Right    Appendectomy      As a child    Rotator cuff repair Right 04/26/2023      Family History   Problem Relation Age of Onset     Diabetes Son         DM      Social History     Socioeconomic History    Marital status:    Tobacco Use    Smoking status: Never    Smokeless tobacco: Never    Tobacco comments:     Denied current smoker   Vaping Use    Vaping status: Never Used   Substance and Sexual Activity    Alcohol use: No     Alcohol/week: 0.0 standard drinks of alcohol    Drug use: No   Other Topics Concern    Caffeine Concern Yes     Comment: coca cola 1-2 cans per day    Exercise Yes     Comment: ice hockey ref,5 hours of skating a week, walking, often    Seat Belt Yes         REVIEW OF SYSTEMS:   GENERAL: feels well otherwise, no unplanned weight change,  normal appetite  SKIN: no rashes or abnormal skin lesions  HEENT: See HPI.    LUNGS: denies shortness of breath or wheezing, See HPI  CARDIOVASCULAR: denies chest pain or palpitations   GI: denies N/V/C or abdominal pain  NEURO: + sinus headaches.  No numbness or tingling in face.    EXAM:   /66   Pulse 72   Temp 97.6 °F (36.4 °C) (Temporal)   Resp 18   Ht 6' 1\" (1.854 m)   Wt 246 lb 9.6 oz (111.9 kg)   SpO2 96%   BMI 32.53 kg/m²   GENERAL: well developed, well nourished,in no apparent distress  SKIN: no rashes,no suspicious lesions  HEAD: atraumatic, normocephalic, + tenderness on palpation of maxillary sinuses  EYES: conjunctiva clear, EOM intact  EARS: TM's pearly, no bulging, no retraction, no fluid, bony landmarks present  NOSE: nostrils patent, clear nasal mucous, nasal mucosa reddened and boggy  THROAT: oral mucosa pink, moist. No visible dental caries. Posterior pharynx is not erythematous. no exudates.  NECK: supple, non-tender  LUNGS: clear to auscultation bilaterally, no wheezes or rhonchi. Breathing is non labored.  CARDIO: RRR without murmur  EXTREMITIES: no cyanosis, clubbing or edema  LYMPH:  no lymphadenopathy.    NEURO:  No focal deficits      ASSESSMENT AND PLAN:     Encounter Diagnosis   Name Primary?    Acute non-recurrent maxillary sinusitis  Yes       No orders of the defined types were placed in this encounter.      Meds & Refills for this Visit:  Requested Prescriptions     Signed Prescriptions Disp Refills    amoxicillin clavulanate 875-125 MG Oral Tab 20 tablet 0     Sig: Take 1 tablet by mouth 2 (two) times daily for 10 days.    fluticasone propionate 50 MCG/ACT Nasal Suspension 1 each 3     Si sprays by Each Nare route daily.           Risks, benefits, side effects of medication addressed and explained.    Patient Instructions   Take antibiotics with food and plenty of water.   Eat yogurt or take probiotic daily. (Probiotic-10 by [employer] is a good example of an OTC probiotic)  Make sure to finish the entire antibiotic treatment.  Increase fluids and rest.     Use flonase-- 2 sprays each nostril at bedtime.  To make sure you're using it properly-- look at the floor, insert the nozzle into the nasal opening and aim the spray toward the ceiling.    Use a gentle sniff when you spray the flonase into the nostril. Avoid using a big \"sniff\" which will send the spray to the back of the throat.  Repeat on the other side.   Don't blow nose for 30 minutes after nasal spray use if possible.    Use OTC meds for comfort as needed--  Ibuprofen/Tylenol for fever/pain  Use Benadryl at bedtime to reduce drainage and promote rest.  Zyrtec/Claritin/Allegra in the AM to reduce nasal drainage without sedation.   Use saline nasal sprays to reduce congestion and thin secretions.   Use Delsym for cough.   Consider applying jevon's vapo-rub or eucayptus oil to chest and feet at bedtime to reduce chest and nasal congestion.   Warm tea with honey, cough lozenges, vaporizers/steam etc.    Monitor symptoms and contact the office if no better in 2-3 days.      The patient indicates understanding of these issues and agrees to the plan.

## 2024-11-01 NOTE — PATIENT INSTRUCTIONS
Take antibiotics with food and plenty of water.   Eat yogurt or take probiotic daily. (Probiotic-10 by [employer] Rebekah is a good example of an OTC probiotic)  Make sure to finish the entire antibiotic treatment.  Increase fluids and rest.     Use flonase-- 2 sprays each nostril at bedtime.  To make sure you're using it properly-- look at the floor, insert the nozzle into the nasal opening and aim the spray toward the ceiling.    Use a gentle sniff when you spray the flonase into the nostril. Avoid using a big \"sniff\" which will send the spray to the back of the throat.  Repeat on the other side.   Don't blow nose for 30 minutes after nasal spray use if possible.    Use OTC meds for comfort as needed--  Ibuprofen/Tylenol for fever/pain  Use Benadryl at bedtime to reduce drainage and promote rest.  Zyrtec/Claritin/Allegra in the AM to reduce nasal drainage without sedation.   Use saline nasal sprays to reduce congestion and thin secretions.   Use Delsym for cough.   Consider applying jevon's vapo-rub or eucayptus oil to chest and feet at bedtime to reduce chest and nasal congestion.   Warm tea with honey, cough lozenges, vaporizers/steam etc.    Monitor symptoms and contact the office if no better in 2-3 days.

## 2024-11-03 ENCOUNTER — HOSPITAL ENCOUNTER (EMERGENCY)
Facility: HOSPITAL | Age: 64
Discharge: HOME | End: 2024-11-03
Attending: EMERGENCY MEDICINE
Payer: COMMERCIAL

## 2024-11-03 VITALS
SYSTOLIC BLOOD PRESSURE: 118 MMHG | BODY MASS INDEX: 32.87 KG/M2 | WEIGHT: 248 LBS | HEART RATE: 75 BPM | OXYGEN SATURATION: 96 % | DIASTOLIC BLOOD PRESSURE: 88 MMHG | RESPIRATION RATE: 17 BRPM | HEIGHT: 73 IN | TEMPERATURE: 97 F

## 2024-11-03 DIAGNOSIS — H21.01 HYPHEMA OF RIGHT EYE: Primary | ICD-10-CM

## 2024-11-03 DIAGNOSIS — H40.051 ELEVATED IOP, RIGHT: ICD-10-CM

## 2024-11-03 DIAGNOSIS — S05.01XA ABRASION OF RIGHT CORNEA, INITIAL ENCOUNTER: ICD-10-CM

## 2024-11-03 PROCEDURE — 99284 EMERGENCY DEPT VISIT MOD MDM: CPT

## 2024-11-03 PROCEDURE — 99283 EMERGENCY DEPT VISIT LOW MDM: CPT

## 2024-11-03 RX ORDER — PREDNISOLONE ACETATE 10 MG/ML
1 SUSPENSION/ DROPS OPHTHALMIC
Qty: 5 ML | Refills: 0 | Status: SHIPPED | OUTPATIENT
Start: 2024-11-03 | End: 2024-11-10

## 2024-11-03 RX ORDER — ERYTHROMYCIN 5 MG/G
1 OINTMENT OPHTHALMIC EVERY 6 HOURS
Qty: 3.5 G | Refills: 0 | Status: SHIPPED | OUTPATIENT
Start: 2024-11-03 | End: 2024-11-10

## 2024-11-03 RX ORDER — CYCLOPENTOLATE HYDROCHLORIDE 10 MG/ML
1 SOLUTION/ DROPS OPHTHALMIC 3 TIMES DAILY
Qty: 5 ML | Refills: 0 | Status: SHIPPED | OUTPATIENT
Start: 2024-11-03

## 2024-11-03 RX ORDER — TETRACAINE HYDROCHLORIDE 5 MG/ML
1 SOLUTION OPHTHALMIC ONCE
Status: COMPLETED | OUTPATIENT
Start: 2024-11-03 | End: 2024-11-03

## 2024-11-03 RX ORDER — BRIMONIDINE TARTRATE AND TIMOLOL MALEATE 2; 5 MG/ML; MG/ML
1 SOLUTION OPHTHALMIC 3 TIMES DAILY
Qty: 5 ML | Refills: 0 | Status: SHIPPED | OUTPATIENT
Start: 2024-11-03

## 2024-11-03 NOTE — ED INITIAL ASSESSMENT (HPI)
Pt was pulling up a powercord and hit his right eye with the end of the cord. Pt reports clear vision but sees a white fog with his right eye. Pt's sclera is reddened on the right side.

## 2024-11-03 NOTE — ED PROVIDER NOTES
Patient Seen in: [facility] Emergency Department      History     Chief Complaint   Patient presents with    Eye Trauma     Stated Complaint: hit right eye accidentally with extension cord, vision cloudy not blurry    Subjective:   HPI      64-year-old male presents with right eye pain.  He states he accidentally struck his right eye with an extension cord while working in the yard.  He says initially everything looked really white but now things are discounted cloudy and blurry.  He reports mild pain to the eye.    Objective:     Past Medical History:    Chest pain, unspecified    Esophageal reflux    Other and unspecified hyperlipidemia    Pure hypercholesterolemia    Special screening for malignant neoplasm of prostate    Unspecified vitamin D deficiency    Visual impairment    GLASSES FOR DISTANCE              Past Surgical History:   Procedure Laterality Date    Anesth,knee arthroscopy  08/01/2006    Anesth,knee arthroscopy  01/01/1982    Right    Appendectomy      As a child    Rotator cuff repair Right 04/26/2023                Social History     Socioeconomic History    Marital status:    Tobacco Use    Smoking status: Never    Smokeless tobacco: Never    Tobacco comments:     Denied current smoker   Vaping Use    Vaping status: Never Used   Substance and Sexual Activity    Alcohol use: No     Alcohol/week: 0.0 standard drinks of alcohol    Drug use: No   Other Topics Concern    Caffeine Concern Yes     Comment: coca cola 1-2 cans per day    Exercise Yes     Comment: ice hockey ref,5 hours of skating a week, walking, often    Seat Belt Yes                  Physical Exam     ED Triage Vitals [11/03/24 1342]   /79   Pulse 83   Resp 18   Temp 97 °F (36.1 °C)   Temp src Temporal   SpO2 96 %   O2 Device None (Room air)       Current Vitals:   Vital Signs  BP: 118/88  Pulse: 75  Resp: 17  Temp: 97 °F (36.1 °C)  Temp src: Temporal  MAP (mmHg): 90    Oxygen Therapy  SpO2: 96 %  O2 Device: None  (Room air)      Right Eye Chart Acuity: 20/20, Corrected  Left Eye Chart Acuity: 20/15, Corrected  Physical Exam  General:  Vitals as listed.  No acute distress   HEENT: Mild conjunctival injection of the right eye.  Under fluorescein examination there is a abrasion at the 9 o'clock position of the lateral aspect of the right cornea.  There is also a small hyphema.  Pupils are reactive bilaterally.  Pupil is round.  No Sidel sign.  No proptosis or ptosis.  Increased IOP of right eye at 33 with left eye 11.  Neuro: Alert oriented and nonfocal   Skin: no rashes or nodules    ED Course   Labs Reviewed - No data to display                MDM      64-year-old male presents with an injury to the right eye.  He says he hit the eye with a extension cord.  There is a very small visible hyphema to the right eye without Sidel sign.  Symmetric reactive pupil with IOP 33    Differential includes but is not limited to corneal abrasion, iritis, globe injury, a life/function threat.    There is increased IOP of the right eye at 33.  No evidence of corneal laceration or globe injury.  Abrasion to the cornea.  Discussed with ophthalmology who will see the patient in the morning and recommends prednisolone, erythromycin ophthalmic ointment, Cyclogyl, timolol/brimonidine.  Patient was given paper copies of these prescriptions as it is Sunday evening and many pharmacies are closing.  He feels confident he can get them filled tonight and will start the medications this evening.  Instructed to call first thing in the morning to            Medical Decision Making      Disposition and Plan     Clinical Impression:  1. Hyphema of right eye    2. Abrasion of right cornea, initial encounter    3. Elevated IOP, right         Disposition:  Discharge  11/3/2024  5:04 pm    Follow-up:  Bogdan Noland MD  [address] [facility] [address]  [phone]    Follow up  Call first thing in the morning to set up an appointment.  Dr. Noland says he will  see you in clinic tomorrow    Jhonny Rain MD  [address]  [facility] [address]  [phone]    Follow up            Medications Prescribed:  Discharge Medication List as of 11/3/2024  5:06 PM        START taking these medications    Details   prednisoLONE 1 % Ophthalmic Suspension Place 1 drop into the right eye every 2 (two) hours for 7 days., Print, Disp-5 mL, R-0      cyclopentolate (CYCLOGYL) 1 % Ophthalmic Solution Place 1 drop into the right eye in the morning, at noon, and at bedtime., Print, Disp-5 mL, R-0      Brimonidine Tartrate-Timolol 0.2-0.5 % Ophthalmic Solution Place 1 drop into the right eye 3 (three) times daily., Print, Disp-5 mL, R-0      erythromycin 5 MG/GM Ophthalmic Ointment Place 1 Application into the right eye every 6 (six) hours for 7 days., Print, Disp-3.5 g, R-0                 Supplementary Documentation:

## 2025-01-02 ENCOUNTER — TELEPHONE (OUTPATIENT)
Dept: FAMILY MEDICINE CLINIC | Facility: CLINIC | Age: 65
End: 2025-01-02

## 2025-01-02 DIAGNOSIS — Z12.5 SCREENING FOR PROSTATE CANCER: ICD-10-CM

## 2025-01-02 DIAGNOSIS — Z00.00 LABORATORY EXAMINATION ORDERED AS PART OF A ROUTINE GENERAL MEDICAL EXAMINATION: ICD-10-CM

## 2025-01-02 DIAGNOSIS — E78.5 DYSLIPIDEMIA: ICD-10-CM

## 2025-01-02 DIAGNOSIS — Z13.1 SCREENING FOR DIABETES MELLITUS: Primary | ICD-10-CM

## 2025-01-02 DIAGNOSIS — Z13.0 SCREENING FOR IRON DEFICIENCY ANEMIA: ICD-10-CM

## 2025-01-02 DIAGNOSIS — Z13.29 SCREENING FOR THYROID DISORDER: ICD-10-CM

## 2025-01-02 DIAGNOSIS — Z13.6 SCREENING FOR CARDIOVASCULAR CONDITION: ICD-10-CM

## 2025-01-02 NOTE — TELEPHONE ENCOUNTER
Please enter lab orders for the patient's upcoming physical appointment.     Physical scheduled:   Your appointments       Date & Time Appointment Department ([place])    Jan 27, 2025 12:30 PM CST Physical - Established with Jhonny Rain MD [facility] ([facility])              [facility]  [address]  [facility] [address]  [phone]           Preferred lab: [facility] ([facility])     The patient has been notified to complete fasting labs prior to their physical appointment.

## 2025-01-02 NOTE — TELEPHONE ENCOUNTER
1. Screening for diabetes mellitus (Primary)  -     Comp Metabolic Panel (14); Future; Expected date: 01/02/2025  2. Screening for iron deficiency anemia  -     CBC With Differential With Platelet; Future; Expected date: 01/02/2025  3. Screening for thyroid disorder  -     TSH W Reflex To Free T4; Future; Expected date: 01/02/2025  4. Screening for cardiovascular condition  -     Lipid Panel; Future; Expected date: 01/02/2025  5. Screening for prostate cancer  -     PSA Total, Screen; Future; Expected date: 01/02/2025  6. Laboratory examination ordered as part of a routine general medical examination  -     TSH W Reflex To Free T4; Future; Expected date: 01/02/2025  -     PSA Total, Screen; Future; Expected date: 01/02/2025  -     Lipid Panel; Future; Expected date: 01/02/2025  -     CBC With Differential With Platelet; Future; Expected date: 01/02/2025  -     Comp Metabolic Panel (14); Future; Expected date: 01/02/2025  7. Dyslipidemia  -     TSH W Reflex To Free T4; Future; Expected date: 01/02/2025  -     Lipid Panel; Future; Expected date: 01/02/2025       OK to notify. Thanks, Scotty Rain MD

## 2025-01-08 ENCOUNTER — OFFICE VISIT (OUTPATIENT)
Dept: FAMILY MEDICINE CLINIC | Facility: CLINIC | Age: 65
End: 2025-01-08
Payer: COMMERCIAL

## 2025-01-08 VITALS
TEMPERATURE: 98 F | BODY MASS INDEX: 32.47 KG/M2 | SYSTOLIC BLOOD PRESSURE: 110 MMHG | WEIGHT: 245 LBS | DIASTOLIC BLOOD PRESSURE: 70 MMHG | HEIGHT: 73 IN | HEART RATE: 85 BPM | OXYGEN SATURATION: 98 %

## 2025-01-08 DIAGNOSIS — H92.01 RIGHT EAR PAIN: Primary | ICD-10-CM

## 2025-01-08 PROCEDURE — 99213 OFFICE O/P EST LOW 20 MIN: CPT | Performed by: PHYSICIAN ASSISTANT

## 2025-01-08 PROCEDURE — 3078F DIAST BP <80 MM HG: CPT | Performed by: PHYSICIAN ASSISTANT

## 2025-01-08 PROCEDURE — 3074F SYST BP LT 130 MM HG: CPT | Performed by: PHYSICIAN ASSISTANT

## 2025-01-08 PROCEDURE — 3008F BODY MASS INDEX DOCD: CPT | Performed by: PHYSICIAN ASSISTANT

## 2025-01-13 NOTE — PROGRESS NOTES
Chief Complaint   Patient presents with    Pain     Pain in right ear started three days ago, little cold, no cough, no fever        HISTORY OF PRESENT ILLNESS  Cristhian Nazario is a 64 year old male who presents for right ear pain. Notes that for the last 3 days, he has had a deep inner ear pain. Wanted to have this checked before getting on a plane. Slight cold, otherwise feeling well. No fevers, chills, significant postnasal drainage, cough, shortness of breath.      Current Outpatient Medications:     ALPRAZolam 0.5 MG Oral Tab, Take 1 tablet (0.5 mg total) by mouth 3 (three) times daily as needed for Anxiety., Disp: 30 tablet, Rfl: 0    fluticasone propionate 50 MCG/ACT Nasal Suspension, 2 sprays by Each Nare route daily., Disp: 1 each, Rfl: 3    pantoprazole 40 MG Oral Tab EC, Take 1 tablet (40 mg total) by mouth 2 (two) times daily before meals., Disp: 180 tablet, Rfl: 1    cyclopentolate (CYCLOGYL) 1 % Ophthalmic Solution, Place 1 drop into the right eye in the morning, at noon, and at bedtime. (Patient not taking: Reported on 1/8/2025), Disp: 5 mL, Rfl: 0    Brimonidine Tartrate-Timolol 0.2-0.5 % Ophthalmic Solution, Place 1 drop into the right eye 3 (three) times daily. (Patient not taking: Reported on 1/8/2025), Disp: 5 mL, Rfl: 0    Tadalafil 10 MG Oral Tab, Take 2 tablets (20 mg total) by mouth as needed for Erectile Dysfunction. 1 hour prior to intercourse, Disp: 30 tablet, Rfl: 5    tamsulosin 0.4 MG Oral Cap, Take 1 capsule (0.4 mg total) by mouth daily. (Patient not taking: Reported on 2/12/2024), Disp: , Rfl:     Allergies: Ragweed    Patient Active Problem List   Diagnosis    Mixed hyperlipidemia    Allergic rhinitis    Hemorrhoids    Memory loss    Depression    Tremor of right hand    ED (erectile dysfunction)    GERD (gastroesophageal reflux disease)    Arthralgia of multiple sites    Rosacea    Arthritis of multiple sites    Onychomycosis of toenail    Abscess of right shoulder    Rotator  cuff syndrome of right shoulder    Right shoulder pain       Past Surgical History:   Procedure Laterality Date    Anesth,knee arthroscopy  08/01/2006    Anesth,knee arthroscopy  01/01/1982    Right    Appendectomy      As a child    Rotator cuff repair Right 04/26/2023       Social History     Socioeconomic History    Marital status:    Tobacco Use    Smoking status: Never    Smokeless tobacco: Never    Tobacco comments:     Denied current smoker   Vaping Use    Vaping status: Never Used   Substance and Sexual Activity    Alcohol use: No     Alcohol/week: 0.0 standard drinks of alcohol    Drug use: No   Other Topics Concern    Caffeine Concern Yes     Comment: coca cola 1-2 cans per day    Exercise Yes     Comment: ice hockey ref,5 hours of skating a week, walking, often    Seat Belt Yes         Vitals:    01/08/25 1155   BP: 110/70   Pulse: 85   Temp: 97.7 °F (36.5 °C)       PHYSICAL EXAM  GENERAL: Well-appearing male in no acute distress.  HEAD: Normocephalic, atraumatic.  EYES: White conjunctiva, clear sclera. PERRL.  EARS: External auditory canals clear bilaterally. No pain with manipulation of tragus or auricle. No mastoid tenderness or erythema. Tympanic membranes clear bilaterally.  NOSE Clear  MOUTH/ THROAT: Moist mucous membranes. Posterior oropharynx clear without exudate or erythema.  NECK: Supple without lymphadenopathy.  CARDIOVASCULAR: Regular rate and rhythm, no murmurs/ rubs/ gallops.  PULMONARY: Normal effort on room air. Clear to auscultation bilaterally. No adventitious breath sounds appreciated.        ASSESSMENT/ PLAN  Right ear pain  Suspect a bit of eustachian tube dysfunction. Can use sudafed or afrin prior to flying and this should help. Normal exam today. Monitor for worsening illness symptoms. Follow up PRN.    Patient expresses understanding and agreement with above plan.  Jacoby Baldwin PA-C

## 2025-01-17 ENCOUNTER — TELEPHONE (OUTPATIENT)
Dept: FAMILY MEDICINE CLINIC | Facility: CLINIC | Age: 65
End: 2025-01-17

## 2025-01-17 DIAGNOSIS — Z13.6 SCREENING FOR CARDIOVASCULAR CONDITION: ICD-10-CM

## 2025-01-17 DIAGNOSIS — Z00.00 LABORATORY EXAMINATION ORDERED AS PART OF A ROUTINE GENERAL MEDICAL EXAMINATION: ICD-10-CM

## 2025-01-17 DIAGNOSIS — Z13.29 SCREENING FOR THYROID DISORDER: ICD-10-CM

## 2025-01-17 DIAGNOSIS — Z13.0 SCREENING FOR IRON DEFICIENCY ANEMIA: ICD-10-CM

## 2025-01-17 DIAGNOSIS — Z12.5 SCREENING FOR PROSTATE CANCER: ICD-10-CM

## 2025-01-17 DIAGNOSIS — E78.5 DYSLIPIDEMIA: ICD-10-CM

## 2025-01-17 DIAGNOSIS — Z13.1 SCREENING FOR DIABETES MELLITUS: Primary | ICD-10-CM

## 2025-01-17 NOTE — TELEPHONE ENCOUNTER
Patient would like active lab orders sent to [facility] please.  Patient would  like a call back to confirm that this has been done.

## 2025-01-21 LAB
ABSOLUTE BASOPHILS: 39 CELLS/UL (ref 0–200)
ABSOLUTE EOSINOPHILS: 154 CELLS/UL (ref 15–500)
ABSOLUTE LYMPHOCYTES: 1563 CELLS/UL (ref 850–3900)
ABSOLUTE MONOCYTES: 454 CELLS/UL (ref 200–950)
ABSOLUTE NEUTROPHILS: 5490 CELLS/UL (ref 1500–7800)
ALBUMIN/GLOBULIN RATIO: 1.6 (CALC) (ref 1–2.5)
ALBUMIN: 4.3 G/DL (ref 3.6–5.1)
ALKALINE PHOSPHATASE: 63 U/L (ref 35–144)
ALT: 13 U/L (ref 9–46)
AST: 17 U/L (ref 10–35)
BASOPHILS: 0.5 %
BILIRUBIN, TOTAL: 0.4 MG/DL (ref 0.2–1.2)
BUN: 16 MG/DL (ref 7–25)
CALCIUM: 9.6 MG/DL (ref 8.6–10.3)
CARBON DIOXIDE: 26 MMOL/L (ref 20–32)
CHLORIDE: 109 MMOL/L (ref 98–110)
CHOL/HDLC RATIO: 4.3 (CALC)
CHOLESTEROL, TOTAL: 201 MG/DL
CREATININE: 0.93 MG/DL (ref 0.7–1.35)
EGFR: 91 ML/MIN/1.73M2
EOSINOPHILS: 2 %
GLOBULIN: 2.7 G/DL (CALC) (ref 1.9–3.7)
GLUCOSE: 79 MG/DL (ref 65–99)
HDL CHOLESTEROL: 47 MG/DL
HEMATOCRIT: 50.6 % (ref 38.5–50)
HEMOGLOBIN: 16.6 G/DL (ref 13.2–17.1)
LDL-CHOLESTEROL: 137 MG/DL (CALC)
LYMPHOCYTES: 20.3 %
MCH: 30.3 PG (ref 27–33)
MCHC: 32.8 G/DL (ref 32–36)
MCV: 92.3 FL (ref 80–100)
MONOCYTES: 5.9 %
MPV: 10.2 FL (ref 7.5–12.5)
NEUTROPHILS: 71.3 %
NON-HDL CHOLESTEROL: 154 MG/DL (CALC)
PLATELET COUNT: 266 THOUSAND/UL (ref 140–400)
POTASSIUM: 4.4 MMOL/L (ref 3.5–5.3)
PROTEIN, TOTAL: 7 G/DL (ref 6.1–8.1)
RDW: 13 % (ref 11–15)
RED BLOOD CELL COUNT: 5.48 MILLION/UL (ref 4.2–5.8)
SODIUM: 145 MMOL/L (ref 135–146)
TOTAL PSA: 0.6 NG/ML
TRIGLYCERIDES: 74 MG/DL
TSH W/REFLEX TO FT4: 1.38 MIU/L (ref 0.4–4.5)
WHITE BLOOD CELL COUNT: 7.7 THOUSAND/UL (ref 3.8–10.8)

## 2025-01-24 ENCOUNTER — OFFICE VISIT (OUTPATIENT)
Dept: FAMILY MEDICINE CLINIC | Facility: CLINIC | Age: 65
End: 2025-01-24
Payer: COMMERCIAL

## 2025-01-24 VITALS
BODY MASS INDEX: 32.6 KG/M2 | WEIGHT: 246 LBS | HEIGHT: 73 IN | RESPIRATION RATE: 14 BRPM | DIASTOLIC BLOOD PRESSURE: 70 MMHG | HEART RATE: 78 BPM | SYSTOLIC BLOOD PRESSURE: 112 MMHG

## 2025-01-24 DIAGNOSIS — K21.9 GASTROESOPHAGEAL REFLUX DISEASE, UNSPECIFIED WHETHER ESOPHAGITIS PRESENT: ICD-10-CM

## 2025-01-24 DIAGNOSIS — E78.2 MIXED HYPERLIPIDEMIA: ICD-10-CM

## 2025-01-24 DIAGNOSIS — F43.0 ACUTE STRESS REACTION: ICD-10-CM

## 2025-01-24 DIAGNOSIS — M13.0 ARTHRITIS OF MULTIPLE SITES: ICD-10-CM

## 2025-01-24 DIAGNOSIS — L71.9 ROSACEA: ICD-10-CM

## 2025-01-24 DIAGNOSIS — Z00.00 ANNUAL PHYSICAL EXAM: Primary | ICD-10-CM

## 2025-01-24 RX ORDER — PANTOPRAZOLE SODIUM 40 MG/1
40 TABLET, DELAYED RELEASE ORAL
Qty: 180 TABLET | Refills: 1 | Status: SHIPPED | OUTPATIENT
Start: 2025-01-24

## 2025-01-24 RX ORDER — TADALAFIL 10 MG/1
20 TABLET ORAL AS NEEDED
Qty: 30 TABLET | Refills: 5 | Status: SHIPPED | OUTPATIENT
Start: 2025-01-24 | End: 2025-03-25

## 2025-01-24 RX ORDER — ALPRAZOLAM 0.5 MG
0.5 TABLET ORAL 3 TIMES DAILY PRN
Qty: 30 TABLET | Refills: 0 | Status: SHIPPED | OUTPATIENT
Start: 2025-01-24

## 2025-01-24 NOTE — PROGRESS NOTES
Cristhian Nazario is a 65 year old male who presents for a complete physical exam.     had concerns including Physical (Annual /) and Immunization/Injection (RSV).   His last annual assessment has been over 1 year: Annual Physical due on 12/20/2024       Subjective:    He complains of doing well, wants to get RSV and PCV 20. Stopped referreeeing hockey. .   Saw Dr Noland from ophthalmology in 11/2024 with powercord hitting   Tobacco:  He has never smoked tobacco.     Wt Readings from Last 4 Encounters:   01/24/25 246 lb (111.6 kg)   01/08/25 245 lb (111.1 kg)   11/03/24 248 lb (112.5 kg)   11/01/24 246 lb 9.6 oz (111.9 kg)     Body mass index is 32.46 kg/m².     The 10-year ASCVD risk score (Radha WHITFIELD, et al., 2019) is: 11.4%    Values used to calculate the score:      Age: 65 years      Sex: Male      Is Non- : No      Diabetic: No      Tobacco smoker: No      Systolic Blood Pressure: 112 mmHg      Is BP treated: No      HDL Cholesterol: 47 mg/dL      Total Cholesterol: 201 mg/dL    Chief Complaint Reviewed and Verified  Nursing Notes Reviewed and   Verified  Tobacco Reviewed  Allergies Reviewed  Medications Reviewed    Problem List Reviewed  Medical History Reviewed  Surgical History   Reviewed  Family History Reviewed          His family history includes Diabetes in his son.   He  reports that he has never smoked. He has never used smokeless tobacco. He reports that he does not drink alcohol and does not use drugs.    Exercise: three times per week.  Diet: watches fats closely    Health Maintenance   Topic Date Due    PSA  01/20/2027      No results found for this or any previous visit.     Health Maintenance   Topic Date Due    Colorectal Cancer Screening  07/15/2029      No recommendations at this time   Health Maintenance Due   Topic Date Due    Pneumococcal Vaccine: 50+ Years (1 of 1 - PCV) Never done    COVID-19 Vaccine (4 - 2024-25 season) 09/01/2024    Influenza Vaccine (1)  10/01/2024    Annual Physical  12/20/2024    Annual Depression Screening  01/01/2025    Fall Risk Screening (Annual)  01/01/2025         Review of Systems   Constitutional: Negative.  Negative for activity change, appetite change, chills and fever.   HENT: Negative.     Eyes: Negative.    Respiratory: Negative.  Negative for shortness of breath.    Cardiovascular: Negative.  Negative for chest pain and palpitations.   Gastrointestinal: Negative.  Negative for abdominal pain.   Genitourinary: Negative.  Negative for dysuria.   Musculoskeletal:  Negative for arthralgias.   Skin: Negative.  Negative for rash.   Allergic/Immunologic: Negative.    Neurological: Negative.         Results:    Lab Results   Component Value Date/Time    WBC 7.7 01/20/2025 07:02 AM    HGB 16.6 01/20/2025 07:02 AM     01/20/2025 07:02 AM      Lab Results   Component Value Date/Time    GLU 79 01/20/2025 07:02 AM     01/20/2025 07:02 AM    K 4.4 01/20/2025 07:02 AM     01/20/2025 07:02 AM    CO2 26 01/20/2025 07:02 AM    CREATSERUM 0.93 01/20/2025 07:02 AM    CA 9.6 01/20/2025 07:02 AM    ALB 4.3 01/20/2025 07:02 AM    TP 7.0 01/20/2025 07:02 AM    ALKPHO 63 01/20/2025 07:02 AM    AST 17 01/20/2025 07:02 AM    ALT 13 01/20/2025 07:02 AM    BILT 0.4 01/20/2025 07:02 AM    TSH 0.88 06/27/2015 11:46 AM        Lab Results   Component Value Date/Time    CHOLEST 201 (H) 01/20/2025 07:02 AM    HDL 47 01/20/2025 07:02 AM    TRIG 74 01/20/2025 07:02 AM     (H) 01/20/2025 07:02 AM    NONHDLC 154 (H) 01/20/2025 07:02 AM       Last A1c value was 5.4% done 12/18/2023.     No results found for requested labs within last 1833 days 8 hours.       Objective:    EXAM:  /70   Pulse 78   Resp 14   Ht 6' 1\" (1.854 m)   Wt 246 lb (111.6 kg)   BMI 32.46 kg/m²  Estimated body mass index is 32.46 kg/m² as calculated from the following:    Height as of this encounter: 6' 1\" (1.854 m).    Weight as of this encounter: 246 lb (111.6 kg).    Physical Exam  Vitals and nursing note reviewed.   Constitutional:       General: He is not in acute distress.     Appearance: Normal appearance. He is well-developed.   HENT:      Head: Normocephalic and atraumatic.      Right Ear: Tympanic membrane and external ear normal.      Left Ear: Tympanic membrane and external ear normal.      Nose: Nose normal.      Mouth/Throat:      Mouth: Mucous membranes are moist.   Eyes:      Extraocular Movements: Extraocular movements intact.      Pupils: Pupils are equal, round, and reactive to light.   Cardiovascular:      Rate and Rhythm: Normal rate and regular rhythm.      Pulses: Normal pulses.           Carotid pulses are 2+ on the right side and 2+ on the left side.       Radial pulses are 2+ on the right side and 2+ on the left side.        Dorsalis pedis pulses are 2+ on the right side and 2+ on the left side.        Posterior tibial pulses are 2+ on the right side and 2+ on the left side.      Heart sounds: Normal heart sounds, S1 normal and S2 normal. No murmur heard.  Pulmonary:      Effort: Pulmonary effort is normal. No respiratory distress.      Breath sounds: Normal breath sounds.   Abdominal:      General: Abdomen is flat. Bowel sounds are normal. There is no distension.      Palpations: Abdomen is soft.   Musculoskeletal:         General: Normal range of motion.      Cervical back: Normal range of motion and neck supple.      Right lower leg: No edema.      Left lower leg: No edema.   Skin:     General: Skin is warm and dry.      Capillary Refill: Capillary refill takes less than 2 seconds.      Findings: No rash.   Neurological:      General: No focal deficit present.      Mental Status: He is alert and oriented to person, place, and time.   Psychiatric:         Mood and Affect: Mood normal.         Behavior: Behavior normal.         Thought Content: Thought content normal.         Judgment: Judgment normal.          Assessment & Plan:    Cristhian Aguillonabelardo Nazario is  a 65 year old male who presents for a complete physical exam.   Pt's weight is Body mass index is 32.46 kg/m²., recommended low fat diet and aerobic exercise 30 minutes three times weekly.   Health maintenance, Up to date    Immunizations: Up to date   Immunization History   Administered Date(s) Administered    Celestone Soluspan 3mg  08/13/2020    Covid-19 Vaccine Moderna 50 Mcg/0.25 Ml 05/10/2022    Covid-19 Vaccine Moderna Bivalent 50mcg/0.5mL 12+ years 01/28/2023    Covid-19 Vaccine Pfizer 30 mcg/0.3 ml 02/23/2021, 03/16/2021, 10/18/2021    FLULAVAL 6 months & older 0.5 ml Prefilled syringe ([id number]) 12/01/2017, 11/09/2018, 11/29/2019    FLUZONE 6 months and older PFS 0.5 ml ([id number]) 03/23/2017, 12/01/2017, 11/09/2018, 11/29/2019, 10/18/2021, 11/04/2023    Flucelvax 0.5 Ml Quad PFS Single Dose 10/05/2020    Flucelvax Influenza vaccine, trivalent (ccIIV3), 0.5mL IM 10/05/2020    High Dose Fluzone Influenza Vaccine, 65yr+ PF 0.5mL ([id number]) 11/22/2024    Influenza 10/05/2020, 10/27/2022    Moderna Covid-19 Vaccine 50mcg/0.5ml 12yrs+ 11/04/2023, 11/22/2024    Pneumococcal Conjugate PCV20 01/24/2025    RSV, recombinant, RSVpreF, adjuvanted (Arexvy) 01/24/2025    TD 11/01/2005    TDAP 03/23/2017    Zoster Vaccine Recombinant Adjuvanted (Shingrix) 10/27/2022, 01/28/2023         Pt info given for: exercise, low fat diet, The patient indicates understanding of these issues and agrees to the plan.  The patient is asked to return for CPX in 1 years.    Assessment & Plan  Annual physical exam    Orders:    Ultrafast Heart Scan    Mixed hyperlipidemia  Cholesterol shows Good control. Long term heart-healthy diet and lifestyle discussed and encouraged to reduce risk of cardiovascular disease.  1/20/2025: CHOLESTEROL, TOTAL 201 (H); HDL CHOLESTEROL 47; TRIGLYCERIDES 74; LDL-CHOLESTEROL 137 (H)  No current Cholesterol medication. stable  Continue with current treatment plan     Orders:    Ultrafast Heart Scan    Gastroesophageal  reflux disease, unspecified whether esophagitis present  Stable on pantoprazole. Continue to monitor and continue present management     Orders:    Pantoprazole Sodium; Take 1 tablet (40 mg total) by mouth 2 (two) times daily before meals.  Dispense: 180 tablet; Refill: 1    Arthritis of multiple sites  Chronic, discussed prevention strategies and stretching       Rosacea  Stable, continue present management and continue to monitor for progression on monocycline.          Acute stress reaction  Stable with alprazolam occasionally.  It is lasting him for a while so keep filling this throughout the year  Orders:    ALPRAZolam; Take 1 tablet (0.5 mg total) by mouth 3 (three) times daily as needed for Anxiety.  Dispense: 30 tablet; Refill: 0       I am having . Cristhian Nazario maintain his tamsulosin, fluticasone propionate, cyclopentolate, Brimonidine Tartrate-Timolol, ALPRAZolam, Tadalafil, and pantoprazole.     Return in about 6 months (around 7/24/2025) for recheck.

## 2025-01-24 NOTE — ASSESSMENT & PLAN NOTE
Stable on pantoprazole. Continue to monitor and continue present management     Orders:    Pantoprazole Sodium; Take 1 tablet (40 mg total) by mouth 2 (two) times daily before meals.  Dispense: 180 tablet; Refill: 1

## 2025-01-24 NOTE — ASSESSMENT & PLAN NOTE
Stable, continue present management and continue to monitor for progression on monocycline.

## 2025-01-24 NOTE — ASSESSMENT & PLAN NOTE
Cholesterol shows Good control. Long term heart-healthy diet and lifestyle discussed and encouraged to reduce risk of cardiovascular disease.  1/20/2025: CHOLESTEROL, TOTAL 201 (H); HDL CHOLESTEROL 47; TRIGLYCERIDES 74; LDL-CHOLESTEROL 137 (H)  No current Cholesterol medication. stable  Continue with current treatment plan     Orders:    Ultrafast Heart Scan

## 2025-08-14 ENCOUNTER — TELEPHONE (OUTPATIENT)
Dept: FAMILY MEDICINE CLINIC | Facility: CLINIC | Age: 65
End: 2025-08-14

## 2025-08-22 ENCOUNTER — OFFICE VISIT (OUTPATIENT)
Dept: FAMILY MEDICINE CLINIC | Facility: CLINIC | Age: 65
End: 2025-08-22

## 2025-08-22 VITALS
BODY MASS INDEX: 31.81 KG/M2 | HEIGHT: 73 IN | OXYGEN SATURATION: 95 % | RESPIRATION RATE: 16 BRPM | SYSTOLIC BLOOD PRESSURE: 122 MMHG | DIASTOLIC BLOOD PRESSURE: 70 MMHG | HEART RATE: 68 BPM | WEIGHT: 240 LBS

## 2025-08-22 DIAGNOSIS — M79.89 LEFT LEG SWELLING: ICD-10-CM

## 2025-08-22 DIAGNOSIS — M25.562 ACUTE PAIN OF LEFT KNEE: Primary | ICD-10-CM

## 2025-08-22 PROCEDURE — 3008F BODY MASS INDEX DOCD: CPT | Performed by: FAMILY MEDICINE

## 2025-08-22 PROCEDURE — G2211 COMPLEX E/M VISIT ADD ON: HCPCS | Performed by: FAMILY MEDICINE

## 2025-08-22 PROCEDURE — 99213 OFFICE O/P EST LOW 20 MIN: CPT | Performed by: FAMILY MEDICINE

## 2025-08-22 PROCEDURE — 3078F DIAST BP <80 MM HG: CPT | Performed by: FAMILY MEDICINE

## 2025-08-22 PROCEDURE — 3074F SYST BP LT 130 MM HG: CPT | Performed by: FAMILY MEDICINE

## 2025-08-22 RX ORDER — NAPROXEN 500 MG/1
500 TABLET ORAL 2 TIMES DAILY WITH MEALS
COMMUNITY
Start: 2025-08-01 | End: 2026-08-01

## 2025-08-26 LAB
ALBUMIN/GLOBULIN RATIO: 1.7 (CALC) (ref 1–2.5)
ALBUMIN: 4.3 G/DL (ref 3.6–5.1)
ALKALINE PHOSPHATASE: 63 U/L (ref 35–144)
ALT: 13 U/L (ref 9–46)
AST: 18 U/L (ref 10–35)
BILIRUBIN, TOTAL: 0.5 MG/DL (ref 0.2–1.2)
BUN: 24 MG/DL (ref 7–25)
C-REACTIVE PROTEIN: <3 MG/L
CALCIUM: 9.2 MG/DL (ref 8.6–10.3)
CARBON DIOXIDE: 27 MMOL/L (ref 20–32)
CHLORIDE: 107 MMOL/L (ref 98–110)
CREATININE: 0.85 MG/DL (ref 0.7–1.35)
D-DIMER, QUANTITATIVE: 0.8 MCG/ML FEU
EGFR: 96 ML/MIN/1.73M2
GLOBULIN: 2.5 G/DL (CALC) (ref 1.9–3.7)
GLUCOSE: 84 MG/DL (ref 65–99)
POTASSIUM: 4.3 MMOL/L (ref 3.5–5.3)
PROTEIN, TOTAL: 6.8 G/DL (ref 6.1–8.1)
SED RATE BY MODIFIED$WESTERGREN: 6 MM/H
SODIUM: 143 MMOL/L (ref 135–146)
URIC ACID: 5.6 MG/DL (ref 4–8)

## (undated) DEVICE — SUPER TURBOVAC 90 IFS: Brand: COBLATION

## (undated) DEVICE — DRAIN SILICONE ROUND 1/8X49

## (undated) DEVICE — [AGGRESSIVE PLUS CUTTER, ARTHROSCOPIC SHAVER BLADE,  DO NOT RESTERILIZE,  DO NOT USE IF PACKAGE IS DAMAGED,  KEEP DRY,  KEEP AWAY FROM SUNLIGHT]: Brand: FORMULA

## (undated) DEVICE — SHOULDER TRACTION KIT AR-1635

## (undated) DEVICE — MEDI-VAC NON-CONDUCTIVE SUCTION TUBING: Brand: CARDINAL HEALTH

## (undated) DEVICE — STERILE POLYISOPRENE POWDER-FREE SURGICAL GLOVES: Brand: PROTEXIS

## (undated) DEVICE — PAD,ABDOMINAL,8"X7.5",ST,LF,20/BX: Brand: MEDLINE INDUSTRIES, INC.

## (undated) DEVICE — LIGHT HANDLE

## (undated) DEVICE — MEDI-VAC SUCTION HANDLE REGULAR CAPACITY: Brand: CARDINAL HEALTH

## (undated) DEVICE — SHEET,DRAPE,40X58,STERILE: Brand: MEDLINE

## (undated) DEVICE — OCCLUSIVE GAUZE STRIP OVERWRAP,3% BISMUTH TRIBROMOPHENATE IN PETROLATUM BLEND: Brand: XEROFORM

## (undated) DEVICE — AMBIENT MEGAVAC 90: Brand: COBLATION

## (undated) DEVICE — SHOULDER ARTHROSCOPY CDS-LF: Brand: MEDLINE INDUSTRIES, INC.

## (undated) DEVICE — 3M™ IOBAN™ 2 ANTIMICROBIAL INCISE DRAPE 6648EZ: Brand: IOBAN™ 2

## (undated) DEVICE — APPLICATOR CHLORAPREP 26ML

## (undated) DEVICE — SUT ETHILON 3-0 PS-2 1669H

## (undated) DEVICE — 3M™ STERI-STRIP™ REINFORCED ADHESIVE SKIN CLOSURES, R1547, 1/2 IN X 4 IN (12 MM X 100 MM), 6 STRIPS/ENVELOPE: Brand: 3M™ STERI-STRIP™

## (undated) DEVICE — SUT ETHILON 3-0 PS-1 1663H

## (undated) DEVICE — COVER,MAYO STAND,STERILE: Brand: MEDLINE

## (undated) DEVICE — SUT MONOCRYL 3-0 PS-2 Y427H

## (undated) DEVICE — WRAP COOLING SHLDR W/ICE PILLO

## (undated) DEVICE — SOLUTION  .9 3000ML

## (undated) DEVICE — SLEEVE KENDALL SCD EXPRESS MED

## (undated) DEVICE — DRAPE,U/SHT,SPLIT,FILM,60X84,STERILE: Brand: MEDLINE

## (undated) DEVICE — TUBING IRR 16FT CNT WV 3 ASCP

## (undated) DEVICE — [RESECTOR CUTTER, ARTHROSCOPIC SHAVER BLADE,  DO NOT RESTERILIZE,  DO NOT USE IF PACKAGE IS DAMAGED,  KEEP DRY,  KEEP AWAY FROM SUNLIGHT]: Brand: FORMULA

## (undated) NOTE — LETTER
Patient Name: Rossy Schultz  : 1960  MRN: [id number]  Patient Address: [place] [address]      Coronavirus Disease 2019 (COVID-19)     [facility] is committed to the safety and well-being of our patients, members, carefully. If your symptoms get worse, call your healthcare provider immediately. 3. Get rest and stay hydrated.    4. If you have a medical appointment, call the healthcare provider ahead of time and tell them that you have or may have COVID-19.  5. For m of fever-reducing medications; and  · Improvement in respiratory symptoms (e.g., cough, shortness of breath); and  · At least 10 days have passed since symptoms first appeared OR if asymptomatic patient or date of symptom onset is unclear then use 10 days donors must:    · Have had a confirmed diagnosis of COVID-19  · Be symptom-free for at least 14 days*    *Some people will be required to have a repeat COVID-19 test in order to be eligible to donate.  If you’re instructed by Kailee that a repeat test is r random. Researchers are trying to identify similarities between people with a Post-COVID condition to better understand if there are risk factors. How do I prevent a Post-COVID condition?   The best way to prevent the long-term symptoms of COVID-19 is

## (undated) NOTE — LETTER
PATIENT NAME: Cristhian Nazario   : 1960       Waiver      DATE: 2025     Dear Patient,    Thank you for choosing [facility] as your health care provider. Your physician has deemed the following medical service(s) necessary.  However, your insurance plan may not pay for all of your health care and costs and may deny payment for this service.  The fact that your insurance plan does not pay for an item or service does not mean you should not receive it.  The purpose of this form is to help you make an informed decision about whether or not you want to receive this service(s) that may not be paid for by your insurance plan.      ESTIMATED COST: Arexvy: $376  Administration: $48  Total: $424          I understand that the above mentioned service(s) or supply may not be covered by my insurance company.  I agree to be financially responsible for the cost of this service or supply in the event my insurance denies payment as a non-covered benefit.      Patient/Insured Signature: ___________________________________________

## (undated) NOTE — LETTER
OSR/LOYD Notification    Patient Name: Susy Lesch / Sex: 1/19/1960-A: 58 y  male  Medical Records: [id number] CSN: [id number]    Surgeon(s):  Surgeon(s):  Venkat Vang MD   Procedure: RIGHT SHOULDER ARTHROSCOPIC  IRRIGATION AND DEBRIDEMENT, INCISION AND DRAINGE OF SUPERFICIAL ABSCESS    Anesthesia Type: General Surgery Date: 6/28/2023    During the pre-operative screening process using the STOP-Bang questionnaire, the patient listed above was identified as being at high risk for obstructive sleep apnea. If you feel it is appropriate to schedule a sleep study, the [facility] will give priority to patients scheduled for procedures to minimize surgical delays. If a sleep study is completed prior to surgery, please fax the results/plan of care to Claude Sales  ([phone]) as this information will be utilized in clinical decision-making regarding the patient's upcoming surgery. Thank you for your assistance in helping us provide a safe, seamless and personal experience for your patient.     MD Vinny Yanes, Department of Anesthesia

## (undated) NOTE — MR AVS SNAPSHOT
[facility]  [place],  [address] [place] [address] [phone]               Thank you for choosing us for your health care visit with Vitaly Esquivel MD.  We are glad to serve you and happy to provide you with this summa Alice Wilkinson (INTERNAL) [[id number] CPT(R)]  Order #:  [id number]                  **REFERRAL REQUEST**    Your physician has referred you to a specialist.  Your physician or the clinic staff will provide you with the phone number you should call Take 100 mg by mouth once daily. Commonly known as:  MINOCIN,DYNACIN           Pantoprazole Sodium 40 MG Tbec   Take 40 mg by mouth every morning before breakfast.   What changed:  Another medication with the same name was removed.  Continue taking this m